# Patient Record
Sex: FEMALE | Race: WHITE | NOT HISPANIC OR LATINO | Employment: OTHER | URBAN - METROPOLITAN AREA
[De-identification: names, ages, dates, MRNs, and addresses within clinical notes are randomized per-mention and may not be internally consistent; named-entity substitution may affect disease eponyms.]

---

## 2017-06-14 ENCOUNTER — OFFICE VISIT (OUTPATIENT)
Dept: OTHER | Facility: HOSPITAL | Age: 72
End: 2017-06-14
Attending: THORACIC SURGERY (CARDIOTHORACIC VASCULAR SURGERY)

## 2017-06-14 VITALS
SYSTOLIC BLOOD PRESSURE: 138 MMHG | HEIGHT: 60 IN | WEIGHT: 139.5 LBS | DIASTOLIC BLOOD PRESSURE: 80 MMHG | TEMPERATURE: 97.6 F | OXYGEN SATURATION: 96 % | RESPIRATION RATE: 16 BRPM | HEART RATE: 90 BPM | BODY MASS INDEX: 27.39 KG/M2

## 2017-06-14 DIAGNOSIS — R91.8 GROUND GLASS OPACITY PRESENT ON IMAGING OF LUNG: ICD-10-CM

## 2017-06-14 DIAGNOSIS — Z85.118 HISTORY OF LUNG CANCER: Primary | ICD-10-CM

## 2017-06-14 PROCEDURE — 99204 OFFICE O/P NEW MOD 45 MIN: CPT | Performed by: THORACIC SURGERY (CARDIOTHORACIC VASCULAR SURGERY)

## 2017-06-14 PROCEDURE — G0463 HOSPITAL OUTPT CLINIC VISIT: HCPCS

## 2017-06-14 RX ORDER — GLIMEPIRIDE 4 MG/1
4 TABLET ORAL
COMMUNITY
Start: 2017-03-21

## 2017-06-14 RX ORDER — LIRAGLUTIDE 6 MG/ML
1.8 INJECTION SUBCUTANEOUS EVERY MORNING
COMMUNITY
Start: 2017-06-12

## 2017-06-14 RX ORDER — CITALOPRAM 20 MG/1
20 TABLET ORAL EVERY MORNING
COMMUNITY
Start: 2017-04-27

## 2017-06-14 RX ORDER — ASPIRIN 81 MG/1
81 TABLET, CHEWABLE ORAL DAILY
COMMUNITY
End: 2017-10-04

## 2017-06-14 RX ORDER — ATORVASTATIN CALCIUM 80 MG/1
80 TABLET, FILM COATED ORAL NIGHTLY
COMMUNITY
Start: 2017-04-27

## 2017-06-14 RX ORDER — LISINOPRIL AND HYDROCHLOROTHIAZIDE 12.5; 1 MG/1; MG/1
1 TABLET ORAL EVERY MORNING
COMMUNITY
Start: 2017-04-27

## 2017-06-14 NOTE — PROGRESS NOTES
Subjective   Patient ID: Augustina Lobo is a 72 y.o. female is being seen for consultation today at the request of Doris Talbert MD    History of Present Illness  Dear Colleague,  Augustina Lobo was seen in the [facility] for evaluation of a by a left lower lobe groundglass opacity.  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  She has done quite well with very little side effects associated with her surgery and has not had any evidence of recurrent or residual disease for several years.  She currently remains asymptomatic with no complaints other than some intermittent arthritis.  She has had a small but steadily increasing in size area of groundglass opacity within the left lower lobe which is been surveilled since 2015.  She has an occasional dry nonproductive cough which is intermittent and she believes is associated to postnasal drip.  She also has shortness of breath with physical activity but this usually resolves spontaneously and is been present since her surgery in 2009.  She reports no recent exacerbation of the symptoms.  She denies any complaints of fever, chills, hemoptysis, pleuritic chest pain, shortness of air, night sweats, hoarseness, or unintentional weight loss. Underlying medical conditions including hypercholesterolemia, diabetes and hypertension remain stable.  She has no other somatic complaints or alleviating or exacerbating factors aside from those mentioned above.    The following portions of the patient's history were reviewed and updated as appropriate: allergies, current medications, past family history, past medical history, past social history, past surgical history and problem list.  Review of Systems   Constitution: Negative for decreased appetite, diaphoresis, fever and weight loss.   Respiratory: Positive for cough. Negative for hemoptysis,  shortness of breath, sleep disturbances due to breathing and sputum production.    All other systems reviewed and are negative.     The patient reports some shortness of breath with occasional cough.  She has some sinus symptoms with allergies.  She has joint pain and stiffness with back pain.    There is no problem list on file for this patient.    Past Medical History:   Diagnosis Date   • Arthritis    • Diabetes mellitus    • Elevated cholesterol    • Hypertension    • Lung cancer 06/09/2009    Resected Right Lobectomy per Dr. Morataya   • Pneumonia      Past Surgical History:   Procedure Laterality Date   • APPENDECTOMY     • BUNIONECTOMY     • DILATATION AND CURETTAGE      Times 2   • HYSTERECTOMY     • TONSILLECTOMY       No family history on file.  Social History     Social History   • Marital status:      Spouse name: N/A   • Number of children: N/A   • Years of education: N/A     Occupational History   • Not on file.     Social History Main Topics   • Smoking status: Former Smoker     Packs/day: 0.75     Years: 18.00     Types: Cigarettes     Quit date: 6/14/1969   • Smokeless tobacco: Never Used   • Alcohol use No   • Drug use: No   • Sexual activity: Defer     Other Topics Concern   • Not on file     Social History Narrative   • No narrative on file       Current Outpatient Prescriptions:   •  aspirin 81 MG chewable tablet, Chew 81 mg Daily., Disp: , Rfl:   •  VICTOZA 18 MG/3ML solution pen-injector, Take 1 tablet by mouth Daily., Disp: , Rfl:   •  atorvastatin (LIPITOR) 80 MG tablet, Take 80 mg by mouth Daily., Disp: , Rfl:   •  citalopram (CeleXA) 20 MG tablet, Take 20 mg by mouth Daily., Disp: , Rfl:   •  glimepiride (AMARYL) 4 MG tablet, Take 4 mg by mouth Daily., Disp: , Rfl:   •  lisinopril-hydrochlorothiazide (PRINZIDE,ZESTORETIC) 10-12.5 MG per tablet, Take 1 tablet by mouth Daily., Disp: , Rfl:   •  metFORMIN (GLUCOPHAGE) 1000 MG tablet, Take 1 tablet by mouth 2 (Two) Times a Day., Disp: ,  Rfl:   Allergies   Allergen Reactions   • Codeine    • Latex Rash   • Sulfa Antibiotics Rash        Objective   Vitals:    06/14/17 1024   BP: 138/80   Pulse: 90   Resp: 16   Temp: 97.6 °F (36.4 °C)   SpO2: 96%     Physical Exam   Constitutional: She is oriented to person, place, and time. Vital signs are normal. She appears well-developed.   HENT:   Head: Normocephalic and atraumatic.   Eyes: EOM are normal. Pupils are equal, round, and reactive to light.   Neck: Normal range of motion. Neck supple. No JVD present. No tracheal deviation present.   Cardiovascular: Normal rate, regular rhythm, normal heart sounds and intact distal pulses.    No murmur heard.  Pulmonary/Chest: Effort normal and breath sounds normal. She has no wheezes. She has no rhonchi. She has no rales. She exhibits no tenderness.   Abdominal: Soft. There is no tenderness.   Musculoskeletal: Normal range of motion. She exhibits no edema or tenderness.   Lymphadenopathy:     She has no cervical adenopathy.   Neurological: She is alert and oriented to person, place, and time. She has normal strength.   Skin: Skin is warm and dry. No rash noted. No cyanosis or erythema.   Psychiatric: She has a normal mood and affect. Her behavior is normal. Thought content normal.     Independent Review of Radiographic Studies:      Assessment/Plan   Assessment:  Prior history of right middle lobe lung cancer, stage IB poorly differentiated adenocarcinoma status post resection in 2009.  New area of groundglass opacity within the left lower lobe superior segment which has increased in size, steadily, over the past 3 years.    Plan:  Mrs. Lobo has a subtle groundglass opacity within left lower lobe that is slowly increasing in size.  I have recommended that we keep a closer surveillance interval and therefore had a we'll see her back in the office in 3 months with a repeat CAT scan of the chest.  If we start to see consolidation or further opacification of this  lesion a CT-guided needle biopsy may be appropriate next step.  I do not believe that this is amenable to bronchoscopy but I do not rule out out as a further means working up this lesion.  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  I will update you after her CAT scan in 3 months and have further recommendations at that time.  Should you have any questions or concerns regarding your patient's care, please do not hesitate to contact me.  Sincerely, Wayne Morataya M.D.  There are no diagnoses linked to this encounter.

## 2017-06-15 NOTE — PATIENT INSTRUCTIONS
Pt seen by Dr. Morataya and will be scheduled for a CT chest at [facility] and will follow up in the thoracic office. Pt given contact cards for Dr. Morataya and nurse navigator.

## 2017-09-25 ENCOUNTER — OFFICE VISIT (OUTPATIENT)
Dept: SURGERY | Facility: CLINIC | Age: 72
End: 2017-09-25

## 2017-09-25 VITALS
OXYGEN SATURATION: 96 % | HEART RATE: 85 BPM | HEIGHT: 60 IN | WEIGHT: 143.6 LBS | DIASTOLIC BLOOD PRESSURE: 71 MMHG | BODY MASS INDEX: 28.19 KG/M2 | SYSTOLIC BLOOD PRESSURE: 127 MMHG

## 2017-09-25 DIAGNOSIS — R91.8 GROUND GLASS OPACITY PRESENT ON IMAGING OF LUNG: ICD-10-CM

## 2017-09-25 DIAGNOSIS — Z85.118 HISTORY OF LUNG CANCER: Primary | ICD-10-CM

## 2017-09-25 PROCEDURE — 99213 OFFICE O/P EST LOW 20 MIN: CPT | Performed by: THORACIC SURGERY (CARDIOTHORACIC VASCULAR SURGERY)

## 2017-09-25 RX ORDER — PIOGLITAZONEHYDROCHLORIDE 15 MG/1
15 TABLET ORAL EVERY MORNING
COMMUNITY
Start: 2017-09-15

## 2017-09-25 NOTE — PROGRESS NOTES
Subjective   Patient ID: Augustina Lobo is a 72 y.o. female is here today for follow-up.    History of Present Illness  Dear Colleague,  Augustina Lobo was seen in our office today for continued follow up and surveillance of her left lower lobe pulmonary nodule.  As you recall she has a history of stage IB adenocarcinoma of the right middle lobe resected in 2009.  She has had surveillance CT scans which have continually demonstrated subtle but steady growth of a left lower lobe groundglass opacity.  This has been suspicious for potential low-grade malignancy such as a minimally invasive adenocarcinoma, formally KASIA or possibly even an infectious or inflammatory condition.  She denies any complaints of fever, chills, cough, hemoptysis, pleuritic chest pain, shortness of air, dyspnea with exertion, night sweats, hoarseness, or unintentional weight loss. Underlying medical conditions including diabetes mellitus, arthritis and hypertension remain stable.  She has no other somatic complaints or alleviating or exacerbating factors aside from those mentioned above.    The following portions of the patient's history were reviewed and updated as appropriate: allergies, current medications, past family history, past medical history, past social history, past surgical history and problem list.  Review of Systems   Constitution: Negative.   HENT: Negative.    Eyes: Negative.    Cardiovascular: Negative.    Respiratory: Positive for cough, shortness of breath and wheezing.         Non productive   Endocrine: Negative.    Hematologic/Lymphatic: Negative.    Skin: Negative.    Musculoskeletal: Negative.    Gastrointestinal: Negative.    Genitourinary: Negative.    Neurological: Negative.    Psychiatric/Behavioral: Negative.      Patient Active Problem List   Diagnosis   • History of lung cancer   • Ground glass opacity present on imaging of lung     Past Medical History:   Diagnosis Date   • Arthritis    • Diabetes mellitus    •  Elevated cholesterol    • Hypertension    • Lung cancer 06/09/2009    Resected Right Lobectomy per Dr. Morataya   • Pneumonia      Past Surgical History:   Procedure Laterality Date   • APPENDECTOMY     • BUNIONECTOMY     • DILATATION AND CURETTAGE      Times 2   • HYSTERECTOMY     • TONSILLECTOMY       No family history on file.  Social History     Social History   • Marital status:      Spouse name: N/A   • Number of children: N/A   • Years of education: N/A     Occupational History   • Not on file.     Social History Main Topics   • Smoking status: Former Smoker     Packs/day: 0.75     Years: 18.00     Types: Cigarettes     Quit date: 6/14/1969   • Smokeless tobacco: Never Used   • Alcohol use No   • Drug use: No   • Sexual activity: Defer     Other Topics Concern   • Not on file     Social History Narrative       Current Outpatient Prescriptions:   •  aspirin 81 MG chewable tablet, Chew 81 mg Daily., Disp: , Rfl:   •  atorvastatin (LIPITOR) 80 MG tablet, Take 80 mg by mouth Daily., Disp: , Rfl:   •  citalopram (CeleXA) 20 MG tablet, Take 20 mg by mouth Daily., Disp: , Rfl:   •  glimepiride (AMARYL) 4 MG tablet, Take 4 mg by mouth Daily., Disp: , Rfl:   •  lisinopril-hydrochlorothiazide (PRINZIDE,ZESTORETIC) 10-12.5 MG per tablet, Take 1 tablet by mouth Daily., Disp: , Rfl:   •  metFORMIN (GLUCOPHAGE) 1000 MG tablet, Take 1 tablet by mouth 2 (Two) Times a Day., Disp: , Rfl:   •  pioglitazone (ACTOS) 15 MG tablet, , Disp: , Rfl:   •  VICTOZA 18 MG/3ML solution pen-injector, Take 1 tablet by mouth Daily., Disp: , Rfl:   Allergies   Allergen Reactions   • Codeine    • Latex Rash   • Sulfa Antibiotics Rash        Objective   Vitals:    09/25/17 1343   BP: 127/71   Pulse: 85   SpO2: 96%     Physical Exam   Constitutional: She is oriented to person, place, and time. Vital signs are normal. She appears well-developed and well-nourished.   HENT:   Head: Normocephalic and atraumatic.   Eyes: EOM are normal. Pupils are  equal, round, and reactive to light.   Neck: Normal range of motion. Neck supple. No JVD present. No tracheal deviation present.   Cardiovascular: Normal rate, regular rhythm, normal heart sounds and intact distal pulses.    No murmur heard.  Pulmonary/Chest: Effort normal and breath sounds normal. She has no wheezes. She has no rhonchi. She has no rales. She exhibits no tenderness.   Abdominal: Soft. There is no tenderness.   Musculoskeletal: Normal range of motion. She exhibits no edema or tenderness.   Lymphadenopathy:     She has no cervical adenopathy.   Neurological: She is alert and oriented to person, place, and time. She has normal strength.   Skin: Skin is warm and dry. No rash noted. No cyanosis or erythema.   Psychiatric: She has a normal mood and affect. Her behavior is normal.     Independent Review of Radiographic Studies:      Assessment/Plan   Assessment:  Enlarging left lower lobe superior segment groundglass opacity.  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    Plan:  I am concerned about the progress of growth of the left lower lobe pulmonary opacity.  I agree that this likely represents a malignancy although infectious or inflammatory etiologies cannot be excluded.  I recommended moving forward with surgical resection of this lesion with a wedge resection or possible segmentectomy possible lobectomy if this is a malignancy.  I believe this is approachable with a video-assisted thoracoscopy as mentioned above wedge or segmentectomy may be a viable option.  She is scheduled for point function test and we will review those prior to making any decisions about surgical resection.  The risk, benefits and alternatives of the surgery were discussed with the patient and her daughter and these include but are not limited to bleeding, infection, and injury to lung.  I will update you after her poorly function tests have been completed and about our plans  moving forward. Should you have any questions or concerns regarding your patient's care, please do not hesitate to contact me.  Sincerely, Wayne Morataya M.D.  There are no diagnoses linked to this encounter.

## 2017-09-26 ENCOUNTER — PREP FOR SURGERY (OUTPATIENT)
Dept: OTHER | Facility: HOSPITAL | Age: 72
End: 2017-09-26

## 2017-09-26 DIAGNOSIS — R79.1 ABNORMAL COAGULATION PROFILE: ICD-10-CM

## 2017-09-26 DIAGNOSIS — R91.1 LUNG NODULE: Primary | ICD-10-CM

## 2017-09-26 RX ORDER — CEFAZOLIN SODIUM 2 G/100ML
2 INJECTION, SOLUTION INTRAVENOUS ONCE
Status: CANCELLED | OUTPATIENT
Start: 2017-10-10 | End: 2017-09-26

## 2017-09-26 RX ORDER — HEPARIN SODIUM 5000 [USP'U]/ML
5000 INJECTION, SOLUTION INTRAVENOUS; SUBCUTANEOUS ONCE
Status: CANCELLED | OUTPATIENT
Start: 2017-10-10 | End: 2017-09-26

## 2017-09-26 RX ORDER — SODIUM CHLORIDE 0.9 % (FLUSH) 0.9 %
1-10 SYRINGE (ML) INJECTION AS NEEDED
Status: CANCELLED | OUTPATIENT
Start: 2017-10-10

## 2017-09-26 NOTE — H&P
History and Physical   Encounter Date: 9/25/2017  Wayne Morataya MD   Thoracic Surgery   Expand All Collapse All    []Hide copied text     Subjective      Patient ID: Augustina Lobo is a 72 y.o. female is here today for follow-up.     History of Present Illness  Dear Colleague,  Augustina Lobo was seen in our office today for continued follow up and surveillance of her left lower lobe pulmonary nodule.  As you recall she has a history of stage IB adenocarcinoma of the right middle lobe resected in 2009.  She has had surveillance CT scans which have continually demonstrated subtle but steady growth of a left lower lobe groundglass opacity.  This has been suspicious for potential low-grade malignancy such as a minimally invasive adenocarcinoma, formally KASIA or possibly even an infectious or inflammatory condition.  She denies any complaints of fever, chills, cough, hemoptysis, pleuritic chest pain, shortness of air, dyspnea with exertion, night sweats, hoarseness, or unintentional weight loss. Underlying medical conditions including diabetes mellitus, arthritis and hypertension remain stable.  She has no other somatic complaints or alleviating or exacerbating factors aside from those mentioned above.     The following portions of the patient's history were reviewed and updated as appropriate: allergies, current medications, past family history, past medical history, past social history, past surgical history and problem list.  Review of Systems   Constitution: Negative.   HENT: Negative.    Eyes: Negative.    Cardiovascular: Negative.    Respiratory: Positive for cough, shortness of breath and wheezing.         Non productive   Endocrine: Negative.    Hematologic/Lymphatic: Negative.    Skin: Negative.    Musculoskeletal: Negative.    Gastrointestinal: Negative.    Genitourinary: Negative.    Neurological: Negative.    Psychiatric/Behavioral: Negative.           Patient Active Problem List   Diagnosis   • History of  lung cancer   • Ground glass opacity present on imaging of lung       Medical History         Past Medical History:   Diagnosis Date   • Arthritis     • Diabetes mellitus     • Elevated cholesterol     • Hypertension     • Lung cancer 06/09/2009     Resected Right Lobectomy per Dr. Morataya   • Pneumonia            Surgical History          Past Surgical History:   Procedure Laterality Date   • APPENDECTOMY       • BUNIONECTOMY       • DILATATION AND CURETTAGE         Times 2   • HYSTERECTOMY       • TONSILLECTOMY             No family history on file.   Social History    Social History            Social History   • Marital status:        Spouse name: N/A   • Number of children: N/A   • Years of education: N/A          Occupational History   • Not on file.            Social History Main Topics   • Smoking status: Former Smoker       Packs/day: 0.75       Years: 18.00       Types: Cigarettes       Quit date: 6/14/1969   • Smokeless tobacco: Never Used   • Alcohol use No   • Drug use: No   • Sexual activity: Defer           Other Topics Concern   • Not on file      Social History Narrative            Current Outpatient Prescriptions:   •  aspirin 81 MG chewable tablet, Chew 81 mg Daily., Disp: , Rfl:   •  atorvastatin (LIPITOR) 80 MG tablet, Take 80 mg by mouth Daily., Disp: , Rfl:   •  citalopram (CeleXA) 20 MG tablet, Take 20 mg by mouth Daily., Disp: , Rfl:   •  glimepiride (AMARYL) 4 MG tablet, Take 4 mg by mouth Daily., Disp: , Rfl:   •  lisinopril-hydrochlorothiazide (PRINZIDE,ZESTORETIC) 10-12.5 MG per tablet, Take 1 tablet by mouth Daily., Disp: , Rfl:   •  metFORMIN (GLUCOPHAGE) 1000 MG tablet, Take 1 tablet by mouth 2 (Two) Times a Day., Disp: , Rfl:   •  pioglitazone (ACTOS) 15 MG tablet, , Disp: , Rfl:   •  VICTOZA 18 MG/3ML solution pen-injector, Take 1 tablet by mouth Daily., Disp: , Rfl:        Allergies   Allergen Reactions   • Codeine     • Latex Rash   • Sulfa Antibiotics Rash             Objective           Vitals:     09/25/17 1343   BP: 127/71   Pulse: 85   SpO2: 96%      Physical Exam   Constitutional: She is oriented to person, place, and time. Vital signs are normal. She appears well-developed and well-nourished.   HENT:   Head: Normocephalic and atraumatic.   Eyes: EOM are normal. Pupils are equal, round, and reactive to light.   Neck: Normal range of motion. Neck supple. No JVD present. No tracheal deviation present.   Cardiovascular: Normal rate, regular rhythm, normal heart sounds and intact distal pulses.    No murmur heard.  Pulmonary/Chest: Effort normal and breath sounds normal. She has no wheezes. She has no rhonchi. She has no rales. She exhibits no tenderness.   Abdominal: Soft. There is no tenderness.   Musculoskeletal: Normal range of motion. She exhibits no edema or tenderness.   Lymphadenopathy:     She has no cervical adenopathy.   Neurological: She is alert and oriented to person, place, and time. She has normal strength.   Skin: Skin is warm and dry. No rash noted. No cyanosis or erythema.   Psychiatric: She has a normal mood and affect. Her behavior is normal.      Independent Review of Radiographic Studies:         Assessment/Plan      Assessment:  Enlarging left lower lobe superior segment groundglass opacity.  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     Plan:  I am concerned about the progress of growth of the left lower lobe pulmonary opacity.  I agree that this likely represents a malignancy although infectious or inflammatory etiologies cannot be excluded.  I recommended moving forward with surgical resection of this lesion with a wedge resection or possible segmentectomy possible lobectomy if this is a malignancy.  I believe this is approachable with a video-assisted thoracoscopy as mentioned above wedge or segmentectomy may be a viable option.  She is scheduled for point function test and we will review those prior to  making any decisions about surgical resection.  The risk, benefits and alternatives of the surgery were discussed with the patient and her daughter and these include but are not limited to bleeding, infection, and injury to lung.  I will update you after her poorly function tests have been completed and about our plans moving forward. Should you have any questions or concerns regarding your patient's care, please do not hesitate to contact me.  Sincerely, Wayne Morataya M.D.  There are no diagnoses linked to this encounter.

## 2017-10-04 ENCOUNTER — APPOINTMENT (OUTPATIENT)
Dept: PREADMISSION TESTING | Facility: HOSPITAL | Age: 72
End: 2017-10-04

## 2017-10-04 VITALS
DIASTOLIC BLOOD PRESSURE: 70 MMHG | WEIGHT: 145 LBS | RESPIRATION RATE: 16 BRPM | HEART RATE: 83 BPM | SYSTOLIC BLOOD PRESSURE: 127 MMHG | BODY MASS INDEX: 26.68 KG/M2 | TEMPERATURE: 97.4 F | HEIGHT: 62 IN | OXYGEN SATURATION: 97 %

## 2017-10-04 DIAGNOSIS — R79.1 ABNORMAL COAGULATION PROFILE: ICD-10-CM

## 2017-10-04 DIAGNOSIS — R91.1 LUNG NODULE: ICD-10-CM

## 2017-10-04 LAB
ABO GROUP BLD: NORMAL
ANION GAP SERPL CALCULATED.3IONS-SCNC: 13.1 MMOL/L
APTT PPP: 31.3 SECONDS (ref 22.7–35.4)
BASOPHILS # BLD AUTO: 0.07 10*3/MM3 (ref 0–0.2)
BASOPHILS NFR BLD AUTO: 1 % (ref 0–1.5)
BLD GP AB SCN SERPL QL: NEGATIVE
BUN BLD-MCNC: 9 MG/DL (ref 8–23)
BUN/CREAT SERPL: 13 (ref 7–25)
CALCIUM SPEC-SCNC: 10.4 MG/DL (ref 8.6–10.5)
CHLORIDE SERPL-SCNC: 100 MMOL/L (ref 98–107)
CO2 SERPL-SCNC: 25.9 MMOL/L (ref 22–29)
CREAT BLD-MCNC: 0.69 MG/DL (ref 0.57–1)
DEPRECATED RDW RBC AUTO: 47.9 FL (ref 37–54)
EOSINOPHIL # BLD AUTO: 0.21 10*3/MM3 (ref 0–0.7)
EOSINOPHIL NFR BLD AUTO: 2.9 % (ref 0.3–6.2)
ERYTHROCYTE [DISTWIDTH] IN BLOOD BY AUTOMATED COUNT: 13.6 % (ref 11.7–13)
GFR SERPL CREATININE-BSD FRML MDRD: 83 ML/MIN/1.73
GLUCOSE BLD-MCNC: 71 MG/DL (ref 65–99)
HCT VFR BLD AUTO: 42.3 % (ref 35.6–45.5)
HGB BLD-MCNC: 13.4 G/DL (ref 11.9–15.5)
IMM GRANULOCYTES # BLD: 0.02 10*3/MM3 (ref 0–0.03)
IMM GRANULOCYTES NFR BLD: 0.3 % (ref 0–0.5)
INR PPP: 0.93 (ref 0.9–1.1)
LYMPHOCYTES # BLD AUTO: 2.77 10*3/MM3 (ref 0.9–4.8)
LYMPHOCYTES NFR BLD AUTO: 37.8 % (ref 19.6–45.3)
MCH RBC QN AUTO: 30.6 PG (ref 26.9–32)
MCHC RBC AUTO-ENTMCNC: 31.7 G/DL (ref 32.4–36.3)
MCV RBC AUTO: 96.6 FL (ref 80.5–98.2)
MONOCYTES # BLD AUTO: 0.4 10*3/MM3 (ref 0.2–1.2)
MONOCYTES NFR BLD AUTO: 5.5 % (ref 5–12)
NEUTROPHILS # BLD AUTO: 3.85 10*3/MM3 (ref 1.9–8.1)
NEUTROPHILS NFR BLD AUTO: 52.5 % (ref 42.7–76)
PLATELET # BLD AUTO: 288 10*3/MM3 (ref 140–500)
PMV BLD AUTO: 10 FL (ref 6–12)
POTASSIUM BLD-SCNC: 4.3 MMOL/L (ref 3.5–5.2)
PROTHROMBIN TIME: 12.1 SECONDS (ref 11.7–14.2)
RBC # BLD AUTO: 4.38 10*6/MM3 (ref 3.9–5.2)
RH BLD: POSITIVE
SODIUM BLD-SCNC: 139 MMOL/L (ref 136–145)
WBC NRBC COR # BLD: 7.32 10*3/MM3 (ref 4.5–10.7)

## 2017-10-04 PROCEDURE — 86901 BLOOD TYPING SEROLOGIC RH(D): CPT | Performed by: THORACIC SURGERY (CARDIOTHORACIC VASCULAR SURGERY)

## 2017-10-04 PROCEDURE — 85610 PROTHROMBIN TIME: CPT | Performed by: THORACIC SURGERY (CARDIOTHORACIC VASCULAR SURGERY)

## 2017-10-04 PROCEDURE — 36415 COLL VENOUS BLD VENIPUNCTURE: CPT

## 2017-10-04 PROCEDURE — 85730 THROMBOPLASTIN TIME PARTIAL: CPT | Performed by: THORACIC SURGERY (CARDIOTHORACIC VASCULAR SURGERY)

## 2017-10-04 PROCEDURE — 86850 RBC ANTIBODY SCREEN: CPT | Performed by: THORACIC SURGERY (CARDIOTHORACIC VASCULAR SURGERY)

## 2017-10-04 PROCEDURE — 80048 BASIC METABOLIC PNL TOTAL CA: CPT | Performed by: THORACIC SURGERY (CARDIOTHORACIC VASCULAR SURGERY)

## 2017-10-04 PROCEDURE — 93005 ELECTROCARDIOGRAM TRACING: CPT

## 2017-10-04 PROCEDURE — 86900 BLOOD TYPING SEROLOGIC ABO: CPT | Performed by: THORACIC SURGERY (CARDIOTHORACIC VASCULAR SURGERY)

## 2017-10-04 PROCEDURE — 85025 COMPLETE CBC W/AUTO DIFF WBC: CPT | Performed by: THORACIC SURGERY (CARDIOTHORACIC VASCULAR SURGERY)

## 2017-10-04 PROCEDURE — 93010 ELECTROCARDIOGRAM REPORT: CPT | Performed by: INTERNAL MEDICINE

## 2017-10-04 RX ORDER — UBIDECARENONE 75 MG
50 CAPSULE ORAL DAILY
COMMUNITY

## 2017-10-04 RX ORDER — ASPIRIN 81 MG/1
81 TABLET ORAL DAILY
COMMUNITY

## 2017-10-04 RX ORDER — ASCORBIC ACID 500 MG
500 TABLET ORAL DAILY
COMMUNITY

## 2017-10-04 NOTE — DISCHARGE INSTRUCTIONS
Take the following medications the morning of surgery with a small sip of water:    CITALOPRAM     ARRIVE AT 5:30    General Instructions:  • Do not eat solid food after midnight the night before surgery.  • You may drink clear liquids day of surgery but must stop at least one hour before your hospital arrival time.  • It is beneficial for you to have a clear drink that contains carbohydrates the day of surgery.  We suggest a 20 ounce bottle of Gatorade or Powerade for non-diabetic patients or a 20 ounce bottle of G2 or Powerade Zero for diabetic patients. (Pediatric patients, are not advised to drink a 20 ounce carbohydrate drink)    Clear liquids are liquids you can see through.  Nothing red in color.     Plain water                               Sports drinks  Sodas                                   Gelatin (Jell-O)  Fruit juices without pulp such as white grape juice and apple juice  Popsicles that contain no fruit or yogurt  Tea or coffee (no cream or milk added)  Gatorade / Powerade  G2 / Powerade Zero    • Infants may have breast milk up to four hours before surgery.  • Infants drinking formula may drink formula up to six hours before surgery.   • Patients who avoid smoking, chewing tobacco and alcohol for 4 weeks prior to surgery have a reduced risk of post-operative complications.  Quit smoking as many days before surgery as you can.  • Do not smoke, use chewing tobacco or drink alcohol the day of surgery.   • If applicable bring your C-PAP/ BI-PAP machine.  • Bring any papers given to you in the doctor’s office.  • Wear clean comfortable clothes and socks.  • Do not wear contact lenses or make-up.  Bring a case for your glasses.   • Bring crutches or walker if applicable.  • Leave all other valuables and jewelry at home.  • The Pre-Admission Testing nurse will instruct you to bring medications if unable to obtain an accurate list in Pre-Admission Testing.        If you were given a blood bank ID arm band  remember to bring it with you the day of surgery.    Preventing a Surgical Site Infection:  • For 2 to 3 days before surgery, avoid shaving with a razor because the razor can irritate skin and make it easier to develop an infection.  • The night prior to surgery sleep in a clean bed with clean clothing.  Do not allow pets to sleep with you.  • Shower on the morning of surgery using a fresh bar of anti-bacterial soap (such as Dial) and clean washcloth.  Dry with a clean towel and dress in clean clothing.  • Ask your surgeon if you will be receiving antibiotics prior to surgery.  • Make sure you, your family, and all healthcare providers clean their hands with soap and water or an alcohol based hand  before caring for you or your wound.    Day of surgery:  Upon arrival, a Pre-op nurse and Anesthesiologist will review your health history, obtain vital signs, and answer questions you may have.  The only belongings needed at this time will be your home medications and if applicable your C-PAP/BI-PAP machine.  If you are staying overnight your family can leave the rest of your belongings in the car and bring them to your room later.  A Pre-op nurse will start an IV and you may receive medication in preparation for surgery, including something to help you relax.  Your family will be able to see you in the Pre-op area.  While you are in surgery your family should notify the waiting room  if they leave the waiting room area and provide a contact phone number.    Please be aware that surgery does come with discomfort.  We want to make every effort to control your discomfort so please discuss any uncontrolled symptoms with your nurse.   Your doctor will most likely have prescribed pain medications.      If you are going home after surgery you will receive individualized written care instructions before being discharged.  A responsible adult must drive you to and from the hospital on the day of your surgery  and stay with you for 24 hours.    If you are staying overnight following surgery, you will be transported to your hospital room following the recovery period.  [facility] has all private rooms.    If you have any questions please call Pre-Admission Testing at [phone].  Deductibles and co-payments are collected on the day of service. Please be prepared to pay the required co-pay, deductible or deposit on the day of service as defined by your plan.

## 2017-10-10 ENCOUNTER — ANESTHESIA EVENT (OUTPATIENT)
Dept: PERIOP | Facility: HOSPITAL | Age: 72
End: 2017-10-10

## 2017-10-10 ENCOUNTER — ANESTHESIA (OUTPATIENT)
Dept: PERIOP | Facility: HOSPITAL | Age: 72
End: 2017-10-10

## 2017-10-10 ENCOUNTER — HOSPITAL ENCOUNTER (INPATIENT)
Facility: HOSPITAL | Age: 72
LOS: 3 days | Discharge: HOME | End: 2017-10-13
Attending: THORACIC SURGERY (CARDIOTHORACIC VASCULAR SURGERY) | Admitting: THORACIC SURGERY (CARDIOTHORACIC VASCULAR SURGERY)

## 2017-10-10 ENCOUNTER — APPOINTMENT (OUTPATIENT)
Dept: GENERAL RADIOLOGY | Facility: HOSPITAL | Age: 72
End: 2017-10-10

## 2017-10-10 DIAGNOSIS — R53.1 GENERALIZED WEAKNESS: ICD-10-CM

## 2017-10-10 DIAGNOSIS — R91.1 LUNG NODULE: ICD-10-CM

## 2017-10-10 DIAGNOSIS — R91.1 LUNG NODULE, SOLITARY: Primary | ICD-10-CM

## 2017-10-10 LAB
GLUCOSE BLDC GLUCOMTR-MCNC: 115 MG/DL (ref 70–130)
GLUCOSE BLDC GLUCOMTR-MCNC: 172 MG/DL (ref 70–130)
GLUCOSE BLDC GLUCOMTR-MCNC: 192 MG/DL (ref 70–130)

## 2017-10-10 PROCEDURE — 94640 AIRWAY INHALATION TREATMENT: CPT

## 2017-10-10 PROCEDURE — 25010000002 DEXAMETHASONE PER 1 MG: Performed by: NURSE ANESTHETIST, CERTIFIED REGISTERED

## 2017-10-10 PROCEDURE — 25010000002 CALCIUM GLUCONATE 1 G/100 ML

## 2017-10-10 PROCEDURE — 88305 TISSUE EXAM BY PATHOLOGIST: CPT | Performed by: THORACIC SURGERY (CARDIOTHORACIC VASCULAR SURGERY)

## 2017-10-10 PROCEDURE — 25010000002 HYDROMORPHONE PER 4 MG: Performed by: NURSE ANESTHETIST, CERTIFIED REGISTERED

## 2017-10-10 PROCEDURE — 88309 TISSUE EXAM BY PATHOLOGIST: CPT | Performed by: THORACIC SURGERY (CARDIOTHORACIC VASCULAR SURGERY)

## 2017-10-10 PROCEDURE — 25010000002 KETOROLAC TROMETHAMINE PER 15 MG: Performed by: NURSE ANESTHETIST, CERTIFIED REGISTERED

## 2017-10-10 PROCEDURE — 94799 UNLISTED PULMONARY SVC/PX: CPT

## 2017-10-10 PROCEDURE — 71010 HC CHEST PA OR AP: CPT

## 2017-10-10 PROCEDURE — 25010000002 HEPARIN (PORCINE) PER 1000 UNITS: Performed by: THORACIC SURGERY (CARDIOTHORACIC VASCULAR SURGERY)

## 2017-10-10 PROCEDURE — 07T74ZZ RESECTION OF THORAX LYMPHATIC, PERCUTANEOUS ENDOSCOPIC APPROACH: ICD-10-PCS | Performed by: THORACIC SURGERY (CARDIOTHORACIC VASCULAR SURGERY)

## 2017-10-10 PROCEDURE — 88332 PATH CONSLTJ SURG EA ADD BLK: CPT | Performed by: THORACIC SURGERY (CARDIOTHORACIC VASCULAR SURGERY)

## 2017-10-10 PROCEDURE — 25010000003 CEFAZOLIN IN DEXTROSE 2-4 GM/100ML-% SOLUTION: Performed by: THORACIC SURGERY (CARDIOTHORACIC VASCULAR SURGERY)

## 2017-10-10 PROCEDURE — 0BJ08ZZ INSPECTION OF TRACHEOBRONCHIAL TREE, VIA NATURAL OR ARTIFICIAL OPENING ENDOSCOPIC: ICD-10-PCS | Performed by: THORACIC SURGERY (CARDIOTHORACIC VASCULAR SURGERY)

## 2017-10-10 PROCEDURE — 25010000002 ONDANSETRON PER 1 MG: Performed by: NURSE ANESTHETIST, CERTIFIED REGISTERED

## 2017-10-10 PROCEDURE — 25010000002 MORPHINE PER 10 MG: Performed by: THORACIC SURGERY (CARDIOTHORACIC VASCULAR SURGERY)

## 2017-10-10 PROCEDURE — 25010000002 FENTANYL CITRATE (PF) 100 MCG/2ML SOLUTION: Performed by: ANESTHESIOLOGY

## 2017-10-10 PROCEDURE — 82962 GLUCOSE BLOOD TEST: CPT

## 2017-10-10 PROCEDURE — C1729 CATH, DRAINAGE: HCPCS | Performed by: THORACIC SURGERY (CARDIOTHORACIC VASCULAR SURGERY)

## 2017-10-10 PROCEDURE — 25010000002 MIDAZOLAM PER 1 MG: Performed by: ANESTHESIOLOGY

## 2017-10-10 PROCEDURE — 88360 TUMOR IMMUNOHISTOCHEM/MANUAL: CPT

## 2017-10-10 PROCEDURE — 88331 PATH CONSLTJ SURG 1 BLK 1SPC: CPT | Performed by: THORACIC SURGERY (CARDIOTHORACIC VASCULAR SURGERY)

## 2017-10-10 PROCEDURE — 25010000002 NEOSTIGMINE PER 0.5 MG: Performed by: NURSE ANESTHETIST, CERTIFIED REGISTERED

## 2017-10-10 PROCEDURE — 25010000002 FENTANYL CITRATE (PF) 100 MCG/2ML SOLUTION: Performed by: NURSE ANESTHETIST, CERTIFIED REGISTERED

## 2017-10-10 PROCEDURE — 25010000002 PHENYLEPHRINE PER 1 ML: Performed by: NURSE ANESTHETIST, CERTIFIED REGISTERED

## 2017-10-10 PROCEDURE — 0BTJ4ZZ RESECTION OF LEFT LOWER LUNG LOBE, PERCUTANEOUS ENDOSCOPIC APPROACH: ICD-10-PCS | Performed by: THORACIC SURGERY (CARDIOTHORACIC VASCULAR SURGERY)

## 2017-10-10 PROCEDURE — 25010000002 MORPHINE SULFATE (PF) 2 MG/ML SOLUTION: Performed by: THORACIC SURGERY (CARDIOTHORACIC VASCULAR SURGERY)

## 2017-10-10 PROCEDURE — 32663 THORACOSCOPY W/LOBECTOMY: CPT | Performed by: THORACIC SURGERY (CARDIOTHORACIC VASCULAR SURGERY)

## 2017-10-10 PROCEDURE — 25010000002 PROPOFOL 10 MG/ML EMULSION: Performed by: NURSE ANESTHETIST, CERTIFIED REGISTERED

## 2017-10-10 PROCEDURE — 88307 TISSUE EXAM BY PATHOLOGIST: CPT | Performed by: THORACIC SURGERY (CARDIOTHORACIC VASCULAR SURGERY)

## 2017-10-10 DEVICE — STPL/LN REINF PERISTRIP DRY VERITAS GEL: Type: IMPLANTABLE DEVICE | Site: LUNG | Status: FUNCTIONAL

## 2017-10-10 DEVICE — PERI-STRIPS DRY WITH VERITAS COLLAGEN MATRIX (PSD-V) IS PREPARED FROM DEHYDRATED BOVINE PERICARDIUM PROCURED FROM CATTLE UNDER 30 MONTHS OF AGE IN THE UNITED STATES. ONE (1) TUBE OF PSD GEL (GEL) IS PROVIDED FOR EVERY TWO (2) POUCHES OF PSD-V. THE GEL IS USED TO CREATE A TEMPORARY BOND BETWEEN THE PSD-V BUTTRESS AND THE SURGICAL STAPLER JAWS UNTIL THE STAPLER IS POSITIONED AND FIRED.
Type: IMPLANTABLE DEVICE | Site: LUNG | Status: FUNCTIONAL
Brand: PERI-STRIPS DRY WITH VERITAS COLLAGEN MATRIX

## 2017-10-10 DEVICE — SEALANT WND FIBRIN TISSEEL VAPOR/HEAT/PREFIL/SYR 10ML: Type: IMPLANTABLE DEVICE | Site: CHEST | Status: FUNCTIONAL

## 2017-10-10 RX ORDER — FENTANYL CITRATE 50 UG/ML
50 INJECTION, SOLUTION INTRAMUSCULAR; INTRAVENOUS
Status: DISCONTINUED | OUTPATIENT
Start: 2017-10-10 | End: 2017-10-10 | Stop reason: HOSPADM

## 2017-10-10 RX ORDER — POLYETHYLENE GLYCOL 3350 17 G/17G
17 POWDER, FOR SOLUTION ORAL DAILY
Status: DISCONTINUED | OUTPATIENT
Start: 2017-10-10 | End: 2017-10-13 | Stop reason: HOSPADM

## 2017-10-10 RX ORDER — PIOGLITAZONEHYDROCHLORIDE 15 MG/1
15 TABLET ORAL EVERY MORNING
Status: DISCONTINUED | OUTPATIENT
Start: 2017-10-10 | End: 2017-10-13 | Stop reason: HOSPADM

## 2017-10-10 RX ORDER — DEXAMETHASONE SODIUM PHOSPHATE 10 MG/ML
INJECTION INTRAMUSCULAR; INTRAVENOUS AS NEEDED
Status: DISCONTINUED | OUTPATIENT
Start: 2017-10-10 | End: 2017-10-10 | Stop reason: SURG

## 2017-10-10 RX ORDER — MORPHINE SULFATE 2 MG/ML
4 INJECTION, SOLUTION INTRAMUSCULAR; INTRAVENOUS EVERY 4 HOURS PRN
Status: DISCONTINUED | OUTPATIENT
Start: 2017-10-10 | End: 2017-10-13 | Stop reason: HOSPADM

## 2017-10-10 RX ORDER — NALOXONE HCL 0.4 MG/ML
0.4 VIAL (ML) INJECTION
Status: DISCONTINUED | OUTPATIENT
Start: 2017-10-10 | End: 2017-10-13 | Stop reason: HOSPADM

## 2017-10-10 RX ORDER — PROMETHAZINE HYDROCHLORIDE 25 MG/ML
12.5 INJECTION, SOLUTION INTRAMUSCULAR; INTRAVENOUS ONCE AS NEEDED
Status: DISCONTINUED | OUTPATIENT
Start: 2017-10-10 | End: 2017-10-10

## 2017-10-10 RX ORDER — BUPIVACAINE HYDROCHLORIDE 2.5 MG/ML
INJECTION, SOLUTION INFILTRATION; PERINEURAL AS NEEDED
Status: DISCONTINUED | OUTPATIENT
Start: 2017-10-10 | End: 2017-10-10 | Stop reason: HOSPADM

## 2017-10-10 RX ORDER — ATORVASTATIN CALCIUM 80 MG/1
80 TABLET, FILM COATED ORAL NIGHTLY
Status: DISCONTINUED | OUTPATIENT
Start: 2017-10-10 | End: 2017-10-13 | Stop reason: HOSPADM

## 2017-10-10 RX ORDER — UBIDECARENONE 75 MG
50 CAPSULE ORAL DAILY
Status: DISCONTINUED | OUTPATIENT
Start: 2017-10-10 | End: 2017-10-10 | Stop reason: CLARIF

## 2017-10-10 RX ORDER — CITALOPRAM 20 MG/1
20 TABLET ORAL EVERY MORNING
Status: DISCONTINUED | OUTPATIENT
Start: 2017-10-10 | End: 2017-10-13 | Stop reason: HOSPADM

## 2017-10-10 RX ORDER — MORPHINE SULFATE 2 MG/ML
2 INJECTION, SOLUTION INTRAMUSCULAR; INTRAVENOUS EVERY 4 HOURS PRN
Status: DISCONTINUED | OUTPATIENT
Start: 2017-10-10 | End: 2017-10-13 | Stop reason: HOSPADM

## 2017-10-10 RX ORDER — ASCORBIC ACID 500 MG
500 TABLET ORAL DAILY
Status: DISCONTINUED | OUTPATIENT
Start: 2017-10-10 | End: 2017-10-13 | Stop reason: HOSPADM

## 2017-10-10 RX ORDER — MIDAZOLAM HYDROCHLORIDE 1 MG/ML
2 INJECTION INTRAMUSCULAR; INTRAVENOUS
Status: DISCONTINUED | OUTPATIENT
Start: 2017-10-10 | End: 2017-10-10 | Stop reason: HOSPADM

## 2017-10-10 RX ORDER — FAMOTIDINE 10 MG/ML
20 INJECTION, SOLUTION INTRAVENOUS ONCE
Status: COMPLETED | OUTPATIENT
Start: 2017-10-10 | End: 2017-10-10

## 2017-10-10 RX ORDER — NALOXONE HCL 0.4 MG/ML
0.2 VIAL (ML) INJECTION AS NEEDED
Status: DISCONTINUED | OUTPATIENT
Start: 2017-10-10 | End: 2017-10-10

## 2017-10-10 RX ORDER — CEFAZOLIN SODIUM 2 G/100ML
2 INJECTION, SOLUTION INTRAVENOUS ONCE
Status: COMPLETED | OUTPATIENT
Start: 2017-10-10 | End: 2017-10-10

## 2017-10-10 RX ORDER — SODIUM CHLORIDE 0.9 % (FLUSH) 0.9 %
1-10 SYRINGE (ML) INJECTION AS NEEDED
Status: DISCONTINUED | OUTPATIENT
Start: 2017-10-10 | End: 2017-10-10 | Stop reason: HOSPADM

## 2017-10-10 RX ORDER — PROPOFOL 10 MG/ML
VIAL (ML) INTRAVENOUS AS NEEDED
Status: DISCONTINUED | OUTPATIENT
Start: 2017-10-10 | End: 2017-10-10 | Stop reason: SURG

## 2017-10-10 RX ORDER — PROMETHAZINE HYDROCHLORIDE 25 MG/1
12.5 TABLET ORAL ONCE AS NEEDED
Status: DISCONTINUED | OUTPATIENT
Start: 2017-10-10 | End: 2017-10-10

## 2017-10-10 RX ORDER — LABETALOL HYDROCHLORIDE 5 MG/ML
INJECTION, SOLUTION INTRAVENOUS AS NEEDED
Status: DISCONTINUED | OUTPATIENT
Start: 2017-10-10 | End: 2017-10-10 | Stop reason: SURG

## 2017-10-10 RX ORDER — BISACODYL 10 MG
10 SUPPOSITORY, RECTAL RECTAL DAILY PRN
Status: DISCONTINUED | OUTPATIENT
Start: 2017-10-10 | End: 2017-10-13 | Stop reason: HOSPADM

## 2017-10-10 RX ORDER — ONDANSETRON 2 MG/ML
4 INJECTION INTRAMUSCULAR; INTRAVENOUS ONCE AS NEEDED
Status: DISCONTINUED | OUTPATIENT
Start: 2017-10-10 | End: 2017-10-10

## 2017-10-10 RX ORDER — EPHEDRINE SULFATE 50 MG/ML
5 INJECTION, SOLUTION INTRAVENOUS ONCE AS NEEDED
Status: DISCONTINUED | OUTPATIENT
Start: 2017-10-10 | End: 2017-10-10

## 2017-10-10 RX ORDER — LABETALOL HYDROCHLORIDE 5 MG/ML
5 INJECTION, SOLUTION INTRAVENOUS
Status: DISCONTINUED | OUTPATIENT
Start: 2017-10-10 | End: 2017-10-10

## 2017-10-10 RX ORDER — SODIUM CHLORIDE, SODIUM LACTATE, POTASSIUM CHLORIDE, CALCIUM CHLORIDE 600; 310; 30; 20 MG/100ML; MG/100ML; MG/100ML; MG/100ML
9 INJECTION, SOLUTION INTRAVENOUS CONTINUOUS
Status: DISCONTINUED | OUTPATIENT
Start: 2017-10-10 | End: 2017-10-13

## 2017-10-10 RX ORDER — ONDANSETRON 4 MG/1
4 TABLET, ORALLY DISINTEGRATING ORAL EVERY 6 HOURS PRN
Status: DISCONTINUED | OUTPATIENT
Start: 2017-10-10 | End: 2017-10-13 | Stop reason: HOSPADM

## 2017-10-10 RX ORDER — LISINOPRIL AND HYDROCHLOROTHIAZIDE 12.5; 1 MG/1; MG/1
1 TABLET ORAL EVERY MORNING
Status: DISCONTINUED | OUTPATIENT
Start: 2017-10-10 | End: 2017-10-13 | Stop reason: HOSPADM

## 2017-10-10 RX ORDER — ONDANSETRON 2 MG/ML
4 INJECTION INTRAMUSCULAR; INTRAVENOUS EVERY 6 HOURS PRN
Status: DISCONTINUED | OUTPATIENT
Start: 2017-10-10 | End: 2017-10-13 | Stop reason: HOSPADM

## 2017-10-10 RX ORDER — KETOROLAC TROMETHAMINE 30 MG/ML
INJECTION, SOLUTION INTRAMUSCULAR; INTRAVENOUS AS NEEDED
Status: DISCONTINUED | OUTPATIENT
Start: 2017-10-10 | End: 2017-10-10 | Stop reason: SURG

## 2017-10-10 RX ORDER — ACETAMINOPHEN 325 MG/1
650 TABLET ORAL EVERY 4 HOURS PRN
Status: DISCONTINUED | OUTPATIENT
Start: 2017-10-10 | End: 2017-10-13 | Stop reason: HOSPADM

## 2017-10-10 RX ORDER — ONDANSETRON 2 MG/ML
INJECTION INTRAMUSCULAR; INTRAVENOUS AS NEEDED
Status: DISCONTINUED | OUTPATIENT
Start: 2017-10-10 | End: 2017-10-10 | Stop reason: SURG

## 2017-10-10 RX ORDER — DIPHENHYDRAMINE HYDROCHLORIDE 50 MG/ML
12.5 INJECTION INTRAMUSCULAR; INTRAVENOUS
Status: DISCONTINUED | OUTPATIENT
Start: 2017-10-10 | End: 2017-10-10

## 2017-10-10 RX ORDER — MIDAZOLAM HYDROCHLORIDE 1 MG/ML
1 INJECTION INTRAMUSCULAR; INTRAVENOUS
Status: DISCONTINUED | OUTPATIENT
Start: 2017-10-10 | End: 2017-10-10 | Stop reason: HOSPADM

## 2017-10-10 RX ORDER — LIDOCAINE HYDROCHLORIDE 20 MG/ML
INJECTION, SOLUTION INFILTRATION; PERINEURAL AS NEEDED
Status: DISCONTINUED | OUTPATIENT
Start: 2017-10-10 | End: 2017-10-10 | Stop reason: SURG

## 2017-10-10 RX ORDER — MINERAL OIL 471.99 G/472ML
OIL TOPICAL AS NEEDED
Status: DISCONTINUED | OUTPATIENT
Start: 2017-10-10 | End: 2017-10-10 | Stop reason: HOSPADM

## 2017-10-10 RX ORDER — GLIPIZIDE 10 MG/1
10 TABLET ORAL
Status: DISCONTINUED | OUTPATIENT
Start: 2017-10-10 | End: 2017-10-13 | Stop reason: HOSPADM

## 2017-10-10 RX ORDER — MAGNESIUM HYDROXIDE 1200 MG/15ML
LIQUID ORAL AS NEEDED
Status: DISCONTINUED | OUTPATIENT
Start: 2017-10-10 | End: 2017-10-10 | Stop reason: HOSPADM

## 2017-10-10 RX ORDER — IPRATROPIUM BROMIDE AND ALBUTEROL SULFATE 2.5; .5 MG/3ML; MG/3ML
3 SOLUTION RESPIRATORY (INHALATION)
Status: COMPLETED | OUTPATIENT
Start: 2017-10-10 | End: 2017-10-12

## 2017-10-10 RX ORDER — ROCURONIUM BROMIDE 10 MG/ML
INJECTION, SOLUTION INTRAVENOUS AS NEEDED
Status: DISCONTINUED | OUTPATIENT
Start: 2017-10-10 | End: 2017-10-10 | Stop reason: SURG

## 2017-10-10 RX ORDER — HYDROCODONE BITARTRATE AND ACETAMINOPHEN 7.5; 325 MG/1; MG/1
2 TABLET ORAL EVERY 4 HOURS PRN
Status: DISCONTINUED | OUTPATIENT
Start: 2017-10-10 | End: 2017-10-11

## 2017-10-10 RX ORDER — NITROGLYCERIN 0.4 MG/1
0.4 TABLET SUBLINGUAL
Status: DISCONTINUED | OUTPATIENT
Start: 2017-10-10 | End: 2017-10-13 | Stop reason: HOSPADM

## 2017-10-10 RX ORDER — PROMETHAZINE HYDROCHLORIDE 25 MG/1
25 SUPPOSITORY RECTAL ONCE AS NEEDED
Status: DISCONTINUED | OUTPATIENT
Start: 2017-10-10 | End: 2017-10-10

## 2017-10-10 RX ORDER — HEPARIN SODIUM 5000 [USP'U]/ML
5000 INJECTION, SOLUTION INTRAVENOUS; SUBCUTANEOUS ONCE
Status: COMPLETED | OUTPATIENT
Start: 2017-10-10 | End: 2017-10-10

## 2017-10-10 RX ORDER — DOCUSATE SODIUM 100 MG/1
100 CAPSULE, LIQUID FILLED ORAL 2 TIMES DAILY
Status: DISCONTINUED | OUTPATIENT
Start: 2017-10-10 | End: 2017-10-13 | Stop reason: HOSPADM

## 2017-10-10 RX ORDER — SENNA AND DOCUSATE SODIUM 50; 8.6 MG/1; MG/1
2 TABLET, FILM COATED ORAL NIGHTLY
Status: DISCONTINUED | OUTPATIENT
Start: 2017-10-10 | End: 2017-10-13 | Stop reason: HOSPADM

## 2017-10-10 RX ORDER — EPHEDRINE SULFATE 50 MG/ML
INJECTION, SOLUTION INTRAVENOUS AS NEEDED
Status: DISCONTINUED | OUTPATIENT
Start: 2017-10-10 | End: 2017-10-10 | Stop reason: SURG

## 2017-10-10 RX ORDER — ONDANSETRON 4 MG/1
4 TABLET, FILM COATED ORAL EVERY 6 HOURS PRN
Status: DISCONTINUED | OUTPATIENT
Start: 2017-10-10 | End: 2017-10-13 | Stop reason: HOSPADM

## 2017-10-10 RX ORDER — SODIUM CHLORIDE 9 MG/ML
75 INJECTION, SOLUTION INTRAVENOUS CONTINUOUS
Status: DISCONTINUED | OUTPATIENT
Start: 2017-10-10 | End: 2017-10-11

## 2017-10-10 RX ORDER — HEPARIN SODIUM 5000 [USP'U]/ML
5000 INJECTION, SOLUTION INTRAVENOUS; SUBCUTANEOUS EVERY 8 HOURS SCHEDULED
Status: DISCONTINUED | OUTPATIENT
Start: 2017-10-11 | End: 2017-10-13 | Stop reason: HOSPADM

## 2017-10-10 RX ORDER — HYDROMORPHONE HYDROCHLORIDE 1 MG/ML
0.5 INJECTION, SOLUTION INTRAMUSCULAR; INTRAVENOUS; SUBCUTANEOUS
Status: DISCONTINUED | OUTPATIENT
Start: 2017-10-10 | End: 2017-10-10

## 2017-10-10 RX ORDER — FENTANYL CITRATE 50 UG/ML
INJECTION, SOLUTION INTRAMUSCULAR; INTRAVENOUS AS NEEDED
Status: DISCONTINUED | OUTPATIENT
Start: 2017-10-10 | End: 2017-10-10 | Stop reason: SURG

## 2017-10-10 RX ORDER — CEFAZOLIN SODIUM 2 G/100ML
2 INJECTION, SOLUTION INTRAVENOUS EVERY 8 HOURS
Status: COMPLETED | OUTPATIENT
Start: 2017-10-10 | End: 2017-10-11

## 2017-10-10 RX ORDER — HYDROMORPHONE HCL 110MG/55ML
PATIENT CONTROLLED ANALGESIA SYRINGE INTRAVENOUS AS NEEDED
Status: DISCONTINUED | OUTPATIENT
Start: 2017-10-10 | End: 2017-10-10 | Stop reason: SURG

## 2017-10-10 RX ORDER — FENTANYL CITRATE 50 UG/ML
50 INJECTION, SOLUTION INTRAMUSCULAR; INTRAVENOUS
Status: DISCONTINUED | OUTPATIENT
Start: 2017-10-10 | End: 2017-10-10

## 2017-10-10 RX ORDER — GLYCOPYRROLATE 0.2 MG/ML
INJECTION INTRAMUSCULAR; INTRAVENOUS AS NEEDED
Status: DISCONTINUED | OUTPATIENT
Start: 2017-10-10 | End: 2017-10-10 | Stop reason: SURG

## 2017-10-10 RX ORDER — PROMETHAZINE HYDROCHLORIDE 25 MG/1
25 TABLET ORAL ONCE AS NEEDED
Status: DISCONTINUED | OUTPATIENT
Start: 2017-10-10 | End: 2017-10-10

## 2017-10-10 RX ORDER — SODIUM CHLORIDE 0.9 % (FLUSH) 0.9 %
1-10 SYRINGE (ML) INJECTION AS NEEDED
Status: DISCONTINUED | OUTPATIENT
Start: 2017-10-10 | End: 2017-10-13 | Stop reason: HOSPADM

## 2017-10-10 RX ORDER — HYDRALAZINE HYDROCHLORIDE 20 MG/ML
5 INJECTION INTRAMUSCULAR; INTRAVENOUS
Status: DISCONTINUED | OUTPATIENT
Start: 2017-10-10 | End: 2017-10-10

## 2017-10-10 RX ORDER — FLUMAZENIL 0.1 MG/ML
0.2 INJECTION INTRAVENOUS AS NEEDED
Status: DISCONTINUED | OUTPATIENT
Start: 2017-10-10 | End: 2017-10-10

## 2017-10-10 RX ADMIN — MIDAZOLAM 2 MG: 1 INJECTION INTRAMUSCULAR; INTRAVENOUS at 06:59

## 2017-10-10 RX ADMIN — ONDANSETRON 4 MG: 2 INJECTION INTRAMUSCULAR; INTRAVENOUS at 10:45

## 2017-10-10 RX ADMIN — MORPHINE SULFATE 4 MG: 2 INJECTION, SOLUTION INTRAMUSCULAR; INTRAVENOUS at 23:17

## 2017-10-10 RX ADMIN — DOCUSATE SODIUM 100 MG: 100 CAPSULE, LIQUID FILLED ORAL at 17:32

## 2017-10-10 RX ADMIN — ATORVASTATIN CALCIUM 80 MG: 80 TABLET, FILM COATED ORAL at 20:19

## 2017-10-10 RX ADMIN — FENTANYL CITRATE 50 MCG: 50 INJECTION INTRAMUSCULAR; INTRAVENOUS at 11:24

## 2017-10-10 RX ADMIN — FAMOTIDINE 20 MG: 10 INJECTION, SOLUTION INTRAVENOUS at 06:59

## 2017-10-10 RX ADMIN — HYDROMORPHONE HYDROCHLORIDE 0.5 MG: 1 INJECTION, SOLUTION INTRAMUSCULAR; INTRAVENOUS; SUBCUTANEOUS at 11:32

## 2017-10-10 RX ADMIN — DEXAMETHASONE SODIUM PHOSPHATE 6 MG: 10 INJECTION INTRAMUSCULAR; INTRAVENOUS at 08:00

## 2017-10-10 RX ADMIN — NEOSTIGMINE METHYLSULFATE 2 MG: 1 INJECTION INTRAMUSCULAR; INTRAVENOUS; SUBCUTANEOUS at 10:45

## 2017-10-10 RX ADMIN — ROCURONIUM BROMIDE 50 MG: 10 INJECTION INTRAVENOUS at 07:40

## 2017-10-10 RX ADMIN — GLYCOPYRROLATE 0.4 MG: 0.2 INJECTION INTRAMUSCULAR; INTRAVENOUS at 10:45

## 2017-10-10 RX ADMIN — METFORMIN HYDROCHLORIDE 1000 MG: 1000 TABLET ORAL at 17:32

## 2017-10-10 RX ADMIN — CEFAZOLIN SODIUM 2 G: 2 INJECTION, SOLUTION INTRAVENOUS at 07:45

## 2017-10-10 RX ADMIN — MORPHINE SULFATE 4 MG: 4 INJECTION, SOLUTION INTRAMUSCULAR; INTRAVENOUS at 17:32

## 2017-10-10 RX ADMIN — PHENYLEPHRINE HYDROCHLORIDE 100 MCG: 10 INJECTION INTRAVENOUS at 07:35

## 2017-10-10 RX ADMIN — FENTANYL CITRATE 50 MCG: 50 INJECTION INTRAMUSCULAR; INTRAVENOUS at 09:29

## 2017-10-10 RX ADMIN — CEFAZOLIN SODIUM 2 G: 2 INJECTION, SOLUTION INTRAVENOUS at 17:32

## 2017-10-10 RX ADMIN — LIDOCAINE HYDROCHLORIDE 60 MG: 20 INJECTION, SOLUTION INFILTRATION; PERINEURAL at 07:29

## 2017-10-10 RX ADMIN — HYDROCODONE BITARTRATE AND ACETAMINOPHEN 2 TABLET: 7.5; 325 TABLET ORAL at 14:15

## 2017-10-10 RX ADMIN — METOPROLOL TARTRATE 25 MG: 25 TABLET ORAL at 20:18

## 2017-10-10 RX ADMIN — EPHEDRINE SULFATE 10 MG: 50 INJECTION INTRAMUSCULAR; INTRAVENOUS; SUBCUTANEOUS at 07:35

## 2017-10-10 RX ADMIN — HYDROMORPHONE HYDROCHLORIDE 0.4 MG: 2 INJECTION, SOLUTION INTRAMUSCULAR; INTRAVENOUS; SUBCUTANEOUS at 10:45

## 2017-10-10 RX ADMIN — PHENYLEPHRINE HYDROCHLORIDE 100 MCG: 10 INJECTION INTRAVENOUS at 09:50

## 2017-10-10 RX ADMIN — PROPOFOL 40 MG: 10 INJECTION, EMULSION INTRAVENOUS at 08:09

## 2017-10-10 RX ADMIN — LISINOPRIL AND HYDROCHLOROTHIAZIDE 1 TABLET: 10; 12.5 TABLET ORAL at 14:30

## 2017-10-10 RX ADMIN — FENTANYL CITRATE 50 MCG: 50 INJECTION INTRAMUSCULAR; INTRAVENOUS at 07:29

## 2017-10-10 RX ADMIN — PROPOFOL 130 MG: 10 INJECTION, EMULSION INTRAVENOUS at 07:29

## 2017-10-10 RX ADMIN — FENTANYL CITRATE 50 MCG: 50 INJECTION INTRAMUSCULAR; INTRAVENOUS at 06:59

## 2017-10-10 RX ADMIN — SODIUM CHLORIDE, POTASSIUM CHLORIDE, SODIUM LACTATE AND CALCIUM CHLORIDE 9 ML/HR: 600; 310; 30; 20 INJECTION, SOLUTION INTRAVENOUS at 06:59

## 2017-10-10 RX ADMIN — HEPARIN SODIUM 5000 UNITS: 5000 INJECTION, SOLUTION INTRAVENOUS; SUBCUTANEOUS at 07:18

## 2017-10-10 RX ADMIN — KETOROLAC TROMETHAMINE 15 MG: 30 INJECTION, SOLUTION INTRAMUSCULAR; INTRAVENOUS at 10:45

## 2017-10-10 RX ADMIN — ROCURONIUM BROMIDE 50 MG: 10 INJECTION INTRAVENOUS at 07:29

## 2017-10-10 RX ADMIN — HYDROCODONE BITARTRATE AND ACETAMINOPHEN 2 TABLET: 7.5; 325 TABLET ORAL at 20:19

## 2017-10-10 RX ADMIN — SENNOSIDES AND DOCUSATE SODIUM 2 TABLET: 8.6; 5 TABLET ORAL at 20:18

## 2017-10-10 RX ADMIN — FENTANYL CITRATE 50 MCG: 50 INJECTION INTRAMUSCULAR; INTRAVENOUS at 09:00

## 2017-10-10 RX ADMIN — PROPOFOL 30 MG: 10 INJECTION, EMULSION INTRAVENOUS at 08:15

## 2017-10-10 RX ADMIN — LABETALOL HYDROCHLORIDE 5 MG: 5 INJECTION, SOLUTION INTRAVENOUS at 10:54

## 2017-10-10 RX ADMIN — IPRATROPIUM BROMIDE AND ALBUTEROL SULFATE 3 ML: .5; 3 SOLUTION RESPIRATORY (INHALATION) at 15:37

## 2017-10-10 RX ADMIN — SODIUM CHLORIDE 75 ML/HR: 9 INJECTION, SOLUTION INTRAVENOUS at 11:47

## 2017-10-10 RX ADMIN — ROCURONIUM BROMIDE 50 MG: 10 INJECTION INTRAVENOUS at 08:09

## 2017-10-10 RX ADMIN — IPRATROPIUM BROMIDE AND ALBUTEROL SULFATE 3 ML: .5; 3 SOLUTION RESPIRATORY (INHALATION) at 20:21

## 2017-10-10 NOTE — NURSING NOTE
"Dr. Morataya at bedside for post-op chest x-ray.  Dr. Morataya stated \"looks good\" in regards to x-ray.  10/10/17 @1121    "

## 2017-10-10 NOTE — ANESTHESIA PREPROCEDURE EVALUATION
Anesthesia Evaluation     Patient summary reviewed and Nursing notes reviewed   NPO Solid Status: > 8 hours  NPO Liquid Status: > 2 hours     Airway   Mallampati: II  Neck ROM: full  no difficulty expected  Dental    (+) lower dentures and upper dentures    Pulmonary     breath sounds clear to auscultation  Cardiovascular     Rhythm: regular    (+) hypertension, hyperlipidemia      Neuro/Psych  GI/Hepatic/Renal/Endo    (+)  diabetes mellitus,     Musculoskeletal     Abdominal   (+) obese,    Substance History      OB/GYN          Other   (+) arthritis   history of cancer                                    Anesthesia Plan    ASA 3     general   (A line)  intravenous induction   Anesthetic plan and risks discussed with patient.

## 2017-10-10 NOTE — ANESTHESIA PROCEDURE NOTES
Airway  Urgency: elective    Date/Time: 10/10/2017 7:31 AM  Airway not difficult    General Information and Staff    Patient location during procedure: OR  Anesthesiologist: BRENDA KELLY  CRNA: CARYN TORRES    Indications and Patient Condition  Indications for airway management: airway protection    Preoxygenated: yes  MILS maintained throughout  Mask difficulty assessment: 2 - vent by mask + OA or adjuvant +/- NMBA    Final Airway Details  Final airway type: endotracheal airway      Successful airway: ETT - double lumen left  Cuffed: yes   Successful intubation technique: direct laryngoscopy  Endotracheal tube insertion site: oral  Blade: Astrid  Blade size: #3  ETT DL size: 35 fr  Cormack-Lehane Classification: grade I - full view of glottis  Placement verified by: chest auscultation, bronchoscopy and capnometry   Measured from: gums  ETT to gums (cm): 27  Number of attempts at approach: 1    Additional Comments  FOB placement of ETT after placement and again after positioning of pt.

## 2017-10-10 NOTE — ANESTHESIA PROCEDURE NOTES
Arterial Line    Patient location during procedure: holding area  Start time: 10/10/2017 6:50 AM  Stop Time:10/10/2017 6:55 AM       Line placed for hemodynamic monitoring.  Performed By   Anesthesiologist: BRENDA KELLY  Preanesthetic Checklist  Completed: patient identified, site marked, surgical consent, pre-op evaluation, timeout performed, IV checked, risks and benefits discussed and monitors and equipment checked  Arterial Line Prep   Sterile Tech: mask and cap  Prep: ChloraPrep  Patient monitoring: blood pressure monitoring, continuous pulse oximetry and EKG  Arterial Line Procedure   Laterality:right  Location:  radial artery  Catheter size: 20 G   Guidance: landmark technique  Number of attempts: 1  Successful placement: yes          Post Assessment   Dressing Type: occlusive dressing applied, secured with tape and wrist guard applied.   Complications no  Circ/Move/Sens Assessment: unchanged.   Patient Tolerance: patient tolerated the procedure well with no apparent complications

## 2017-10-10 NOTE — OP NOTE
Preoperative diagnosis:   Lung nodule [R91.1]  Postoperative diagnosis:  Post-Op Diagnosis Codes:     * Lung nodule [R91.1]  Left lower lobe adenocarcinoma in situ  Procedure:  LEFT THORACOSCOPY VIDEO ASSISTED.EXPLORATORY LEFT THORACOSCOPY WITH SUPERIOR SEGMENTECTOMY, COMPLETION LEFT LOWER LOBECTOMY EXPLORATORY BRONCHOSCOPY. SUBPLEURAL PAIN CATHETER INSERTION X2  Surgeon:  Wayne Morataya M.D.  Asst.:  Marlys Recinos CSA  Anesthesia:  General anesthesia  IV fluids:  800 cc   EBL:   100 cc   Urine output:  550 mL  Drain (s):  28 F Chest tube   Specimen:    ID Type Source Tests Collected by Time Destination   A : L10  Tissue Lymph Node TISSUE EXAM Wayne Morataya MD 10/10/2017 0920     B : L11 Tissue Lymph Node TISSUE EXAM Wayne Morataya MD 10/10/2017 0920     C : LLL Superior Segment/1.7 cm left lower lobe nodule ground glass opacity Tissue Lung, Left Lower Lobe TISSUE EXAM Wayne Morataya MD 10/10/2017 0924     D : Station 7  Tissue Lymph Node TISSUE EXAM Wayne Morataya MD 10/10/2017 0954     E : Left lower lobe  Tissue Lung, Left Lower Lobe TISSUE EXAM Wayne Morataya MD 10/10/2017 1016         Indications:  This is a 72-year-old female with a prior history of right middle lobe adenocarcinoma resected in 2009.  She presents with an enlarging left lower lobe groundglass opacity initially measuring 1.2 cm, then 1.5 cm and currently 1.7 cm on her most recent CT scan of the chest.  In light of this progressive changes in this groundglass opacity or concerns for malignancy or high.  We decided proceed with the above-mentioned procedure.  The risk, benefits and alternatives were discussed with the patient and included but are not limited to bleeding, infection, injury to lung, recurrence, cardiac risk and possible anesthesia related complications.  The patient understood these risk.  All her questions were answered to her satisfaction and consent was obtained.  Description of procedure:  The patient was  identified in the preoperative holding area and taken back to the operating room.  Both the operative site and the surgeon were identified by the nursing staff.  Both DVT prophylaxis and antibiotics were initiated.  The patient was sedated and a double-lumen endotracheal tube was inserted.  A flexible bronchoscopy was performed and there was no evidence of endoluminal obstruction or extrinsic airway compression.  The right middle lobe bronchial stump was intact with no evidence of any recurrent or residual disease.  The scope was withdrawn and the patient was then placed in a right lateral decubitus position with all pressure points and bony prominences padded.  The left arm was supported in a Ant arm support in a neutral position.  The left chest was then prepped and draped in the usual sterile fashion.  The lung was dropped by the anesthesia service.  A small incision was made at the ninth intercostal space posterior axillary line and the thoracoscope was inserted into the pleural space.  2 additional port sites were placed for passage of instruments, at the fifth intercostal space anterior axial line and another at the mid axillary line at the fourth intercostal space.  We attempted to identify the lesion within the left lower lobe several attempts to palpate this were unsuccessful.  Utilizing the CT scan images and report we were able to localize the area of interest to be the left lower lobe superior segment.  At this point we proceeded with a left lower lobe superior segmentectomy.  We began with dissection along the fissure.  The superior segmental pulmonary artery branch was identified, circumferentially ligated and transected with Endo GI stapler vascular load.  Dissection posteriorly identified the superior segmental bronchus this was circumferentially ligated and transected with Endo TEA stapler with green load.  The inferior pulmonary vein and superior segmental branch was circumferentially ligated and  transected with Endo GI stapler vascular.  We then inflated the lower lobe is identified and demarcated the non-aerated lung of the superior segment which was then sequentially transected with Endo TEA stapler with green load and Barbie-Strips.  Specimen was placed into a specimen bag and brought out through the utility incision.  I inspected the specimen and sent down to pathology to review.  Frozen section was nondiagnostic and they were unable to identify the 1.7 cm groundglass opacity which is been seen on her CT scan.  I was concerned that groundglass opacity may be within the remaining in situ lung.  This point I made a decision to proceed with a completion left lower lobectomy.  The inferior pulmonary vein was circumferentially ligated and transected with Endo TEA stapler with vascular load.  The remaining basilar pulmonary artery was transected in similar fashion with Endo TEA stapler vascular load.  The bronchus was occluded test inflated the upper lobe which expanded without difficulty and then we transected the bronchus with Endo TEA stapler.  The specimen was placed to the utility bag and brought out through the utility incision.  It was sent for frozen which confirmed the presence of carcinoma in situ, adenocarcinoma.  Additional lymph nodes at the station 7 as well as L 10 end L 11 were removed and sent for permanent evaluation.  We irrigated the chest cavity checked the bronchial stump no air leak was detected.  At this point an On-Q pain pump was placed with 2 subpleural pain catheters tunneled along the paravertebral gutter in a cephalad orientation.  Each of these was prepped with 0.25% Marcaine 10 cc per catheter.  Tisseel was dispense along the dissection bed.  A single 20 chest tube was inserted and secured in position.  Platelet rich plasma was dispense along the dissection bed as well.  The lung was reexpanded the scope was withdrawn.  The port sites were irrigated and closed with a formal  suture in multiple layers.  Sterile dressings were applied.  The patient was awakened and extubated in stable condition. At the end of the procedure the sponge needle and instrument count was correct ×2.  The patient was transported to the PACU in stable condition.  Findings:  Left lower lobe lung cancer, adenocarcinoma in situ final pathology and staging pending.

## 2017-10-10 NOTE — ANESTHESIA POSTPROCEDURE EVALUATION
Patient: Augustina Lobo    Procedure Summary     Date Anesthesia Start Anesthesia Stop Room / Location    10/10/17 0722 1102  [facility] / [facility]       Procedure Diagnosis Surgeon Provider    LEFT THORACOSCOPY VIDEO ASSISTED.EXPLORATORY LEFT THORACOSCOPY WITH SUPERIOR SEGMENTECTOMY, COMPLETION LEFT LOWER LOBECTOMY EXPLORATORY BRONCHOSCOPY. SUBPLEURAL PAIN CATHETER INSERTION X2 (Left Chest) Lung nodule  (Lung nodule [R91.1]) MD Harry Alonzo MD          Anesthesia Type: general  Last vitals  BP   131/71 (10/10/17 1135)    Temp   37.1 °C (98.8 °F) (10/10/17 1100)    Pulse   93 (10/10/17 1135)   Resp   16 (10/10/17 1135)    SpO2   98 % (10/10/17 1135)      Post Anesthesia Care and Evaluation    Patient location during evaluation: PACU  Patient participation: complete - patient participated  Level of consciousness: awake  Pain management: adequate  Airway patency: patent  Anesthetic complications: No anesthetic complications  PONV Status: none  Cardiovascular status: acceptable  Respiratory status: acceptable  Hydration status: acceptable

## 2017-10-10 NOTE — BRIEF OP NOTE
THORACOSCOPY VIDEO ASSISTED POSSIBLE THORACOTOMY  Progress Note    Augustina Lobo  10/10/2017    Pre-op Diagnosis:   Lung nodule [R91.1]       Post-Op Diagnosis Codes:     * Lung nodule [R91.1]    Procedure/CPT® Codes:      Procedure(s):  LEFT THORACOSCOPY VIDEO ASSISTED.EXPLORATORY LEFT THORACOSCOPY WITH SUPERIOR SEGMENTECTOMY, COMPLETION LEFT LOWER LOBECTOMY EXPLORATORY BRONCHOSCOPY. SUBPLEURAL PAIN CATHETER INSERTION X2    Surgeon(s):  Wayne Morataya MD    Anesthesia: General    Staff:   Circulator: Magda Aragon RN; Mariela Stokes RN  Perfusionist: Veronica Saba  Scrub Person: Buddy Webber  Assistant: Marlys Recinos CSA  Orientee: Mariela Lockett RN  IVF: 800 cc   Estimated Blood Loss: 100 mL    Urine Voided: 550 mL    Specimens:                  ID Type Source Tests Collected by Time Destination   A : L10  Tissue Lymph Node TISSUE EXAM Wayne Morataya MD 10/10/2017 0920    B : L11 Tissue Lymph Node TISSUE EXAM Wayne Morataya MD 10/10/2017 0920    C : LLL Superior Segment/1.7 cm left lower lobe nodule ground glass opacity Tissue Lung, Left Lower Lobe TISSUE EXAM Wayne Morataya MD 10/10/2017 0924    D : Station 7  Tissue Lymph Node TISSUE EXAM Wayne Morataya MD 10/10/2017 0954    E : Left lower lobe  Tissue Lung, Left Lower Lobe TISSUE EXAM Wayne Morataya MD 10/10/2017 1016          Drains:   Chest Tube 10/10/17 1024 chest tube placed by physician (Active)       Drain/Device Site 10/10/17 0940 Left chest analgesia infusion pump/device (Active)       Urethral Catheter 10/10/17 0745 100% silicone 16 10 10 (Active)           Findings: LLL Adenocarcinoma in situ     Complications: None       Wayne Morataya MD     Date: 10/10/2017  Time: 10:57 AM

## 2017-10-11 ENCOUNTER — APPOINTMENT (OUTPATIENT)
Dept: GENERAL RADIOLOGY | Facility: HOSPITAL | Age: 72
End: 2017-10-11

## 2017-10-11 LAB
ANION GAP SERPL CALCULATED.3IONS-SCNC: 12.2 MMOL/L
BUN BLD-MCNC: 11 MG/DL (ref 8–23)
BUN/CREAT SERPL: 17.2 (ref 7–25)
CALCIUM SPEC-SCNC: 8.2 MG/DL (ref 8.6–10.5)
CHLORIDE SERPL-SCNC: 100 MMOL/L (ref 98–107)
CO2 SERPL-SCNC: 25.8 MMOL/L (ref 22–29)
CREAT BLD-MCNC: 0.64 MG/DL (ref 0.57–1)
DEPRECATED RDW RBC AUTO: 46.3 FL (ref 37–54)
ERYTHROCYTE [DISTWIDTH] IN BLOOD BY AUTOMATED COUNT: 13.2 % (ref 11.7–13)
GFR SERPL CREATININE-BSD FRML MDRD: 91 ML/MIN/1.73
GLUCOSE BLD-MCNC: 130 MG/DL (ref 65–99)
GLUCOSE BLDC GLUCOMTR-MCNC: 133 MG/DL (ref 70–130)
GLUCOSE BLDC GLUCOMTR-MCNC: 178 MG/DL (ref 70–130)
GLUCOSE BLDC GLUCOMTR-MCNC: 184 MG/DL (ref 70–130)
GLUCOSE BLDC GLUCOMTR-MCNC: 203 MG/DL (ref 70–130)
HCT VFR BLD AUTO: 33.7 % (ref 35.6–45.5)
HGB BLD-MCNC: 10.8 G/DL (ref 11.9–15.5)
MCH RBC QN AUTO: 30.6 PG (ref 26.9–32)
MCHC RBC AUTO-ENTMCNC: 32 G/DL (ref 32.4–36.3)
MCV RBC AUTO: 95.5 FL (ref 80.5–98.2)
PLATELET # BLD AUTO: 223 10*3/MM3 (ref 140–500)
PMV BLD AUTO: 10.1 FL (ref 6–12)
POTASSIUM BLD-SCNC: 4.4 MMOL/L (ref 3.5–5.2)
RBC # BLD AUTO: 3.53 10*6/MM3 (ref 3.9–5.2)
SODIUM BLD-SCNC: 138 MMOL/L (ref 136–145)
WBC NRBC COR # BLD: 8.61 10*3/MM3 (ref 4.5–10.7)

## 2017-10-11 PROCEDURE — 97162 PT EVAL MOD COMPLEX 30 MIN: CPT

## 2017-10-11 PROCEDURE — 71010 HC CHEST PA OR AP: CPT

## 2017-10-11 PROCEDURE — 25010000003 CEFAZOLIN IN DEXTROSE 2-4 GM/100ML-% SOLUTION: Performed by: THORACIC SURGERY (CARDIOTHORACIC VASCULAR SURGERY)

## 2017-10-11 PROCEDURE — 97110 THERAPEUTIC EXERCISES: CPT

## 2017-10-11 PROCEDURE — 94799 UNLISTED PULMONARY SVC/PX: CPT

## 2017-10-11 PROCEDURE — 25010000002 HEPARIN (PORCINE) PER 1000 UNITS: Performed by: THORACIC SURGERY (CARDIOTHORACIC VASCULAR SURGERY)

## 2017-10-11 PROCEDURE — 25010000002 MORPHINE SULFATE (PF) 2 MG/ML SOLUTION: Performed by: THORACIC SURGERY (CARDIOTHORACIC VASCULAR SURGERY)

## 2017-10-11 PROCEDURE — 85027 COMPLETE CBC AUTOMATED: CPT | Performed by: THORACIC SURGERY (CARDIOTHORACIC VASCULAR SURGERY)

## 2017-10-11 PROCEDURE — 82962 GLUCOSE BLOOD TEST: CPT

## 2017-10-11 PROCEDURE — 99024 POSTOP FOLLOW-UP VISIT: CPT | Performed by: NURSE PRACTITIONER

## 2017-10-11 PROCEDURE — 80048 BASIC METABOLIC PNL TOTAL CA: CPT | Performed by: THORACIC SURGERY (CARDIOTHORACIC VASCULAR SURGERY)

## 2017-10-11 RX ORDER — HYDROCODONE BITARTRATE AND ACETAMINOPHEN 7.5; 325 MG/1; MG/1
1 TABLET ORAL EVERY 4 HOURS PRN
Status: DISCONTINUED | OUTPATIENT
Start: 2017-10-11 | End: 2017-10-13 | Stop reason: HOSPADM

## 2017-10-11 RX ORDER — HYDROCODONE BITARTRATE AND ACETAMINOPHEN 7.5; 325 MG/1; MG/1
2 TABLET ORAL EVERY 4 HOURS PRN
Status: DISCONTINUED | OUTPATIENT
Start: 2017-10-11 | End: 2017-10-13 | Stop reason: HOSPADM

## 2017-10-11 RX ADMIN — OXYCODONE HYDROCHLORIDE AND ACETAMINOPHEN 500 MG: 500 TABLET ORAL at 08:47

## 2017-10-11 RX ADMIN — HEPARIN SODIUM 5000 UNITS: 5000 INJECTION, SOLUTION INTRAVENOUS; SUBCUTANEOUS at 13:45

## 2017-10-11 RX ADMIN — HYDROCODONE BITARTRATE AND ACETAMINOPHEN 2 TABLET: 7.5; 325 TABLET ORAL at 00:19

## 2017-10-11 RX ADMIN — IPRATROPIUM BROMIDE AND ALBUTEROL SULFATE 3 ML: .5; 3 SOLUTION RESPIRATORY (INHALATION) at 21:43

## 2017-10-11 RX ADMIN — PIOGLITAZONE 15 MG: 15 TABLET ORAL at 06:14

## 2017-10-11 RX ADMIN — IPRATROPIUM BROMIDE AND ALBUTEROL SULFATE 3 ML: .5; 3 SOLUTION RESPIRATORY (INHALATION) at 15:15

## 2017-10-11 RX ADMIN — DOCUSATE SODIUM 100 MG: 100 CAPSULE, LIQUID FILLED ORAL at 17:10

## 2017-10-11 RX ADMIN — HEPARIN SODIUM 5000 UNITS: 5000 INJECTION, SOLUTION INTRAVENOUS; SUBCUTANEOUS at 06:13

## 2017-10-11 RX ADMIN — METOPROLOL TARTRATE 25 MG: 25 TABLET ORAL at 20:17

## 2017-10-11 RX ADMIN — MORPHINE SULFATE 2 MG: 2 INJECTION, SOLUTION INTRAMUSCULAR; INTRAVENOUS at 17:10

## 2017-10-11 RX ADMIN — METFORMIN HYDROCHLORIDE 1000 MG: 1000 TABLET ORAL at 17:10

## 2017-10-11 RX ADMIN — CITALOPRAM 20 MG: 20 TABLET, FILM COATED ORAL at 06:15

## 2017-10-11 RX ADMIN — HYDROCODONE BITARTRATE AND ACETAMINOPHEN 2 TABLET: 7.5; 325 TABLET ORAL at 09:20

## 2017-10-11 RX ADMIN — METFORMIN HYDROCHLORIDE 1000 MG: 1000 TABLET ORAL at 08:47

## 2017-10-11 RX ADMIN — LISINOPRIL AND HYDROCHLOROTHIAZIDE 1 TABLET: 10; 12.5 TABLET ORAL at 06:14

## 2017-10-11 RX ADMIN — SODIUM CHLORIDE 75 ML/HR: 9 INJECTION, SOLUTION INTRAVENOUS at 09:12

## 2017-10-11 RX ADMIN — HYDROCODONE BITARTRATE AND ACETAMINOPHEN 2 TABLET: 7.5; 325 TABLET ORAL at 05:34

## 2017-10-11 RX ADMIN — HYDROCODONE BITARTRATE AND ACETAMINOPHEN 1 TABLET: 7.5; 325 TABLET ORAL at 13:46

## 2017-10-11 RX ADMIN — METOPROLOL TARTRATE 25 MG: 25 TABLET ORAL at 08:49

## 2017-10-11 RX ADMIN — Medication 1 TABLET: at 11:13

## 2017-10-11 RX ADMIN — GLIPIZIDE 10 MG: 10 TABLET ORAL at 06:14

## 2017-10-11 RX ADMIN — MORPHINE SULFATE 4 MG: 2 INJECTION, SOLUTION INTRAMUSCULAR; INTRAVENOUS at 23:37

## 2017-10-11 RX ADMIN — IPRATROPIUM BROMIDE AND ALBUTEROL SULFATE 3 ML: .5; 3 SOLUTION RESPIRATORY (INHALATION) at 06:54

## 2017-10-11 RX ADMIN — HEPARIN SODIUM 5000 UNITS: 5000 INJECTION, SOLUTION INTRAVENOUS; SUBCUTANEOUS at 20:18

## 2017-10-11 RX ADMIN — HYDROCODONE BITARTRATE AND ACETAMINOPHEN 2 TABLET: 7.5; 325 TABLET ORAL at 20:18

## 2017-10-11 RX ADMIN — DOCUSATE SODIUM 100 MG: 100 CAPSULE, LIQUID FILLED ORAL at 08:49

## 2017-10-11 RX ADMIN — POLYETHYLENE GLYCOL 3350 17 G: 17 POWDER, FOR SOLUTION ORAL at 08:47

## 2017-10-11 RX ADMIN — CEFAZOLIN SODIUM 2 G: 2 INJECTION, SOLUTION INTRAVENOUS at 00:20

## 2017-10-11 NOTE — NURSING NOTE
Spoke with Dr. Morataya via telephone, pt has home med victoza, new order for victoza 1.8, will put in order, telephone readback and verified

## 2017-10-11 NOTE — PROGRESS NOTES
Discharge Planning Assessment  [facility]     Patient Name: Augustina Lobo  MRN: [id number]  Today's Date: 10/11/2017    Admit Date: 10/10/2017          Discharge Needs Assessment       10/11/17 1309    Living Environment    Lives With alone    Living Arrangements house    Home Accessibility stairs within home   basement    Transportation Available car;family or friend will provide    Living Environment    Provides Primary Care For no one    Quality Of Family Relationships supportive    Discharge Needs Assessment    Equipment Currently Used at Home none            Discharge Plan       10/11/17 1310    Case Management/Social Work Plan    Plan Home    Patient/Family In Agreement With Plan yes    Additional Comments Met with pt and daughter (Jennifer) at bedside.  Introduced self, explained CCP role, facesheet verified.  Pt states she lives in house alone.  Denies use of any DME.  Has never used HH or SNU in past.  Plans to return home and daughter Jennifer plans to stay with patient at discharge.  Pt concerned about cost of Victoza, will check financial programs.   LORIE Flores RN        Discharge Placement     No information found                Demographic Summary       10/11/17 1307    Referral Information    Admission Type inpatient    Arrived From home or self-care    Referral Source admission list    Reason For Consult discharge planning    Contact Information    Permission Granted to Share Information With family/designee   Cheri Lazaro (daughter) [phone]    Primary Care Physician Information    Name Mariam Nunez             Functional Status       10/11/17 1308    Functional Status Current    Ambulation 2-->assistive person    Transferring 2-->assistive person    Toileting 2-->assistive person    Bathing 2-->assistive person    Dressing 2-->assistive person    Eating 0-->independent    Communication 0-->understands/communicates without difficulty    Functional Status Prior    Ambulation 0-->independent     Transferring 0-->independent    Toileting 0-->independent    Bathing 0-->independent    Dressing 0-->independent    Eating 0-->independent    Communication 0-->understands/communicates without difficulty    Cognitive/Perceptual/Developmental    Current Mental Status/Cognitive Functioning no deficits noted            Psychosocial     None            Abuse/Neglect     None            Legal     None            Substance Abuse     None            Patient Forms     None          Pati Flores

## 2017-10-11 NOTE — PLAN OF CARE
Problem: Patient Care Overview (Adult)  Goal: Plan of Care Review    10/11/17 1000   Outcome Evaluation   Outcome Summary/Follow up Plan Pt POD1 VAT. Pt previously independent. Pt demonstrates decreased dynamic balance while walking. Encouraged to ambulate with nursing. Will progress gait training/balance training/stair training. Will progress as tolerated. ANticipate home with assist when medically ready.          Problem: Inpatient Physical Therapy  Goal: Bed Mobility Goal LTG- PT    10/11/17 1000   Bed Mobility PT LTG   Bed Mobility PT LTG, Date Established 10/11/17   Bed Mobility PT LTG, Time to Achieve 1 wk   Bed Mobility PT LTG, Activity Type all bed mobility   Bed Mobility PT LTG, [place] Level independent       Goal: Transfer Training Goal 1 LTG- PT    10/11/17 1000   Transfer Training PT LTG   Transfer Training PT LTG, Date Established 10/11/17   Transfer Training PT LTG, Time to Achieve 1 wk   Transfer Training PT LTG, Activity Type all transfers   Transfer Training PT LTG, [place] Level independent       Goal: Gait Training Goal LTG- PT    10/11/17 1000   Gait Training PT LTG   Gait Training Goal PT LTG, Date Established 10/11/17   Gait Training Goal PT LTG, Time to Achieve 1 wk   Gait Training Goal PT LTG, [place] Level independent   Gait Training Goal PT LTG, Distance to Achieve 150       Goal: Stair Training Goal LTG- PT    10/11/17 1000   Stair Training PT LTG   Stair Training Goal PT LTG, Date Established 10/11/17   Stair Training Goal PT LTG, Time to Achieve 1 wk   Stair Training Goal PT LTG, Number of Steps 12   Stair Training Goal PT LTG, [place] Level supervision required   Stair Training Goal PT LTG, Assist Device 2 handrails

## 2017-10-11 NOTE — PLAN OF CARE
Problem: Patient Care Overview (Adult)  Goal: Plan of Care Review  Outcome: Ongoing (interventions implemented as appropriate)    10/11/17 0318   Coping/Psychosocial Response Interventions   Plan Of Care Reviewed With patient;daughter   Patient Care Overview   Progress improving         10/11/17 0318   Coping/Psychosocial Response Interventions   Plan Of Care Reviewed With patient;daughter   Patient Care Overview   Progress progress towards functional goals is fair         Problem: Chest Tube Drainage Device (Adult)  Goal: Signs and Symptoms of Listed Potential Problems Will be Absent or Manageable (Chest Tube Drainage Device)  Outcome: Ongoing (interventions implemented as appropriate)    10/11/17 0318   Chest Tube Drainage Device   Problems Assessed (Chest Tube Drainage Device) pain   Problems Present (Chest Tube Drainage Device) pain         Problem: Pain, Acute (Adult)  Goal: Identify Related Risk Factors and Signs and Symptoms  Outcome: Ongoing (interventions implemented as appropriate)    10/11/17 0318   Pain, Acute   Related Risk Factors (Acute Pain) surgery   Signs and Symptoms (Acute Pain) fatigue/weakness

## 2017-10-11 NOTE — THERAPY EVALUATION
Acute Care - Physical Therapy Initial Evaluation  [facility]     Patient Name: Augustina Loob  : 1945  MRN: [id number]  Today's Date: 10/11/2017                Admit Date: 10/10/2017     Visit Dx:    ICD-10-CM ICD-9-CM   1. Lung nodule, solitary R91.1 793.11   2. Lung nodule R91.1 793.11   3. Generalized weakness R53.1 780.79     Patient Active Problem List   Diagnosis   • History of lung cancer   • Ground glass opacity present on imaging of lung   • Lung nodule   • Lung nodule, solitary     Past Medical History:   Diagnosis Date   • Arthritis    • Diabetes mellitus    • Elevated cholesterol    • History of diverticulitis    • Hypertension    • Lung cancer 2009    Resected Right Lobectomy per Dr. Morataya     Past Surgical History:   Procedure Laterality Date   • APPENDECTOMY     • BUNIONECTOMY     • DILATATION AND CURETTAGE      Times 2   • EYE SURGERY     • HYSTERECTOMY     • LUNG LOBECTOMY Right    • THORACOSCOPY Left 10/10/2017    Procedure: LEFT THORACOSCOPY VIDEO ASSISTED.EXPLORATORY LEFT THORACOSCOPY WITH SUPERIOR SEGMENTECTOMY, COMPLETION LEFT LOWER LOBECTOMY EXPLORATORY BRONCHOSCOPY. SUBPLEURAL PAIN CATHETER INSERTION X2;  Surgeon: Wayne Morataya MD;  Location: [facility] MAIN OR;  Service:    • TONSILLECTOMY            PT ASSESSMENT (last 72 hours)      PT Evaluation       10/11/17 0955 10/10/17 0614    Rehab Evaluation    Document Type evaluation  -MG     Subjective Information no complaints  -MG     Patient Effort, Rehab Treatment excellent  -MG     General Information    General Observations supine in bed, agreeable to therapy   -MG     Precautions/Limitations fall precautions   chest tube  -MG     Prior Level of Function independent:  -MG     Equipment Currently Used at Home none  -MG none  -SP    Plans/Goals Discussed With patient  -MG     Barriers to Rehab none identified  -MG     Living Environment    Lives With  alone  -SP    Living Arrangements  house  -SP    Home Accessibility  stairs  within home  -SP    Stair Railings at Home  inside, present at both sides  -SP    Type of Financial/Environmental Concern  none  -SP    Transportation Available  car;family or friend will provide  -SP    Living Environment Comment stairs to basement   -MG     Clinical Impression    Patient/Family Goals Statement return home   -MG     Criteria for Skilled Therapeutic Interventions Met yes;treatment indicated  -MG     Impairments Found (describe specific impairments) aerobic capacity/endurance;gait, locomotion, and balance;muscle performance  -MG     Rehab Potential good, to achieve stated therapy goals  -MG     Vital Signs    Posttreatment Heart Rate (beats/min) 105  -MG     Pre SpO2 (%) 98  -MG     O2 Delivery Pre Treatment room air  -MG     O2 Delivery Intra Treatment room air  -MG     Post SpO2 (%) 94  -MG     Pain Assessment    Pain Assessment No/denies pain  -MG     Cognitive Assessment/Intervention    Current Cognitive/Communication Assessment functional  -MG     Orientation Status oriented x 4  -MG     Follows Commands/Answers Questions 100% of the time  -MG     Personal Safety WNL/WFL  -MG     Personal Safety Interventions fall prevention program maintained  -MG     ROM (Range of Motion)    General ROM no range of motion deficits identified  -MG     MMT (Manual Muscle Testing)    General MMT Assessment --   generalized weakness  -MG     Bed Mobility, Assessment/Treatment    Bed Mob, Supine to Sit, Cross supervision required  -MG     Bed Mob, Sit to Supine, Cross supervision required  -MG     Transfer Assessment/Treatment    Transfers, Sit-Stand Cross contact guard assist;1 person + 1 person to manage equipment  -MG     Transfers, Stand-Sit Cross stand by assist;1 person + 1 person to manage equipment  -MG     Transfers, Sit-Stand-Sit, Assist Device --   no AD  -MG     Transfer, Impairments strength decreased  -MG     Gait Assessment/Treatment    Gait, Cross Level contact  guard assist;1 person + 1 person to manage equipment  -MG     Gait, Assistive Device --   no AD  -MG     Gait, Distance (Feet) 100  -MG     Gait, Gait Pattern Analysis swing-through gait  -MG     Gait, Gait Deviations --   occasional loss of balance to R, holds onto hallway rail   -MG     Gait, Safety Issues balance decreased during turns  -MG     Gait, Impairments impaired balance  -MG     Positioning and Restraints    Pre-Treatment Position in bed  -MG     Post Treatment Position bed  -MG     In Bed call light within reach;encouraged to call for assist;with family/caregiver  -MG       User Key  (r) = Recorded By, (t) = Taken By, (c) = Cosigned By    Initials Name Provider Type    SP Kristina Hills, RN Registered Nurse    MG Megan Gosselin, PT Physical Therapist          Physical Therapy Education     Title: PT OT SLP Therapies (Active)     Topic: Physical Therapy (Active)     Point: Mobility training (Done)    Learning Progress Summary    Learner Readiness Method Response Comment Documented by Status   Patient Acceptance E VU,NR  MG 10/11/17 0959 Done               Point: Body mechanics (Done)    Learning Progress Summary    Learner Readiness Method Response Comment Documented by Status   Patient Acceptance E VU,NR  MG 10/11/17 0959 Done               Point: Precautions (Done)    Learning Progress Summary    Learner Readiness Method Response Comment Documented by Status   Patient Acceptance E VU,NR  MG 10/11/17 0959 Done                      User Key     Initials Effective Dates Name Provider Type Discipline     09/13/17 -  Megan Gosselin, PT Physical Therapist PT                PT Recommendation and Plan  Anticipated Discharge Disposition: home  Planned Therapy Interventions: balance training, gait training, home exercise program, bed mobility training, patient/family education, stair training, transfer training, strengthening  PT Frequency: daily  Plan of Care Review  Outcome Summary/Follow up Plan: Pt POD1  VAT. Pt previously independent. Pt demonstrates decreased dynamic balance while walking. Encouraged to ambulate with nursing. Will progress gait training/balance training/stair training. Will progress as tolerated. ANticipate home with assist when medically ready.           IP PT Goals       10/11/17 1000          Bed Mobility PT LTG    Bed Mobility PT LTG, Date Established 10/11/17  -MG      Bed Mobility PT LTG, Time to Achieve 1 wk  -MG      Bed Mobility PT LTG, Activity Type all bed mobility  -MG      Bed Mobility PT LTG, [place] Level independent  -MG      Transfer Training PT LTG    Transfer Training PT LTG, Date Established 10/11/17  -MG      Transfer Training PT LTG, Time to Achieve 1 wk  -MG      Transfer Training PT LTG, Activity Type all transfers  -MG      Transfer Training PT LTG, [place] Level independent  -MG      Gait Training PT LTG    Gait Training Goal PT LTG, Date Established 10/11/17  -MG      Gait Training Goal PT LTG, Time to Achieve 1 wk  -MG      Gait Training Goal PT LTG, [place] Level independent  -MG      Gait Training Goal PT LTG, Distance to Achieve 150  -MG      Stair Training PT LTG    Stair Training Goal PT LTG, Date Established 10/11/17  -MG      Stair Training Goal PT LTG, Time to Achieve 1 wk  -MG      Stair Training Goal PT LTG, Number of Steps 12  -MG      Stair Training Goal PT LTG, [place] Level supervision required  -MG      Stair Training Goal PT LTG, Assist Device 2 handrails  -MG        User Key  (r) = Recorded By, (t) = Taken By, (c) = Cosigned By    Initials Name Provider Type    MG Megan Gosselin, PT Physical Therapist                Outcome Measures       10/11/17 1000          How much help from another person do you currently need...    Turning from your back to your side while in flat bed without using bedrails? 4  -MG      Moving from lying on back to sitting on the side of a flat bed without bedrails? 4  -MG      Moving to and from a bed to a  chair (including a wheelchair)? 3  -MG      Standing up from a chair using your arms (e.g., wheelchair, bedside chair)? 3  -MG      Climbing 3-5 steps with a railing? 2  -MG      To walk in hospital room? 3  -MG      AM-PAC 6 Clicks Score 19  -MG      Functional Assessment    Outcome Measure Options AM-PAC 6 Clicks Basic Mobility (PT)  -MG        User Key  (r) = Recorded By, (t) = Taken By, (c) = Cosigned By    Initials Name Provider Type    MG Megan Gosselin, PT Physical Therapist           Time Calculation:         PT Charges       10/11/17 1005          Time Calculation    Start Time 0943  -MG      Stop Time 0953  -MG      Time Calculation (min) 10 min  -MG      PT Received On 10/11/17  -MG      PT - Next Appointment 10/12/17  -MG      PT Goal Re-Cert Due Date 10/18/17  -MG        User Key  (r) = Recorded By, (t) = Taken By, (c) = Cosigned By    Initials Name Provider Type    MG Megan Gosselin, PT Physical Therapist          Therapy Charges for Today     Code Description Service Date Service Provider Modifiers Qty    41411996016 HC PT EVAL MOD COMPLEXITY 2 10/11/2017 Megan Gosselin, PT GP 1    65640586479 HC PT THER PROC EA 15 MIN 10/11/2017 Megan Gosselin, PT GP 1    07004350734 HC PT THER SUPP EA 15 MIN 10/11/2017 Megan Gosselin, PT GP 1          PT G-Codes  Outcome Measure Options: AM-PAC 6 Clicks Basic Mobility (PT)      Megan Gosselin, PT  10/11/2017

## 2017-10-12 ENCOUNTER — APPOINTMENT (OUTPATIENT)
Dept: GENERAL RADIOLOGY | Facility: HOSPITAL | Age: 72
End: 2017-10-12

## 2017-10-12 LAB
GLUCOSE BLDC GLUCOMTR-MCNC: 163 MG/DL (ref 70–130)
GLUCOSE BLDC GLUCOMTR-MCNC: 166 MG/DL (ref 70–130)
GLUCOSE BLDC GLUCOMTR-MCNC: 195 MG/DL (ref 70–130)
GLUCOSE BLDC GLUCOMTR-MCNC: 217 MG/DL (ref 70–130)

## 2017-10-12 PROCEDURE — 82962 GLUCOSE BLOOD TEST: CPT

## 2017-10-12 PROCEDURE — 71010 HC CHEST PA OR AP: CPT

## 2017-10-12 PROCEDURE — 25010000002 HEPARIN (PORCINE) PER 1000 UNITS: Performed by: THORACIC SURGERY (CARDIOTHORACIC VASCULAR SURGERY)

## 2017-10-12 PROCEDURE — 99024 POSTOP FOLLOW-UP VISIT: CPT | Performed by: NURSE PRACTITIONER

## 2017-10-12 PROCEDURE — 94799 UNLISTED PULMONARY SVC/PX: CPT

## 2017-10-12 RX ORDER — FAMOTIDINE 20 MG/1
20 TABLET, FILM COATED ORAL EVERY 12 HOURS SCHEDULED
Status: DISCONTINUED | OUTPATIENT
Start: 2017-10-12 | End: 2017-10-13 | Stop reason: HOSPADM

## 2017-10-12 RX ORDER — FAMOTIDINE 10 MG/ML
20 INJECTION, SOLUTION INTRAVENOUS EVERY 12 HOURS SCHEDULED
Status: DISCONTINUED | OUTPATIENT
Start: 2017-10-12 | End: 2017-10-13 | Stop reason: HOSPADM

## 2017-10-12 RX ADMIN — HEPARIN SODIUM 5000 UNITS: 5000 INJECTION, SOLUTION INTRAVENOUS; SUBCUTANEOUS at 06:31

## 2017-10-12 RX ADMIN — HYDROCODONE BITARTRATE AND ACETAMINOPHEN 2 TABLET: 7.5; 325 TABLET ORAL at 06:34

## 2017-10-12 RX ADMIN — PIOGLITAZONE 15 MG: 15 TABLET ORAL at 06:33

## 2017-10-12 RX ADMIN — DOCUSATE SODIUM 100 MG: 100 CAPSULE, LIQUID FILLED ORAL at 09:10

## 2017-10-12 RX ADMIN — HYDROCODONE BITARTRATE AND ACETAMINOPHEN 2 TABLET: 7.5; 325 TABLET ORAL at 01:11

## 2017-10-12 RX ADMIN — METOPROLOL TARTRATE 25 MG: 25 TABLET ORAL at 09:10

## 2017-10-12 RX ADMIN — FAMOTIDINE 20 MG: 20 TABLET, FILM COATED ORAL at 21:36

## 2017-10-12 RX ADMIN — DOCUSATE SODIUM 100 MG: 100 CAPSULE, LIQUID FILLED ORAL at 17:27

## 2017-10-12 RX ADMIN — DOCUSATE SODIUM 100 MG: 100 CAPSULE, LIQUID FILLED ORAL at 04:21

## 2017-10-12 RX ADMIN — HEPARIN SODIUM 5000 UNITS: 5000 INJECTION, SOLUTION INTRAVENOUS; SUBCUTANEOUS at 14:25

## 2017-10-12 RX ADMIN — HYDROCODONE BITARTRATE AND ACETAMINOPHEN 1 TABLET: 7.5; 325 TABLET ORAL at 14:25

## 2017-10-12 RX ADMIN — Medication 1 TABLET: at 10:49

## 2017-10-12 RX ADMIN — FAMOTIDINE 20 MG: 20 TABLET, FILM COATED ORAL at 14:46

## 2017-10-12 RX ADMIN — IPRATROPIUM BROMIDE AND ALBUTEROL SULFATE 3 ML: .5; 3 SOLUTION RESPIRATORY (INHALATION) at 07:09

## 2017-10-12 RX ADMIN — METFORMIN HYDROCHLORIDE 1000 MG: 1000 TABLET ORAL at 09:10

## 2017-10-12 RX ADMIN — GLIPIZIDE 10 MG: 10 TABLET ORAL at 06:33

## 2017-10-12 RX ADMIN — METOPROLOL TARTRATE 25 MG: 25 TABLET ORAL at 21:34

## 2017-10-12 RX ADMIN — POLYETHYLENE GLYCOL 3350 17 G: 17 POWDER, FOR SOLUTION ORAL at 04:20

## 2017-10-12 RX ADMIN — LISINOPRIL AND HYDROCHLOROTHIAZIDE 1 TABLET: 10; 12.5 TABLET ORAL at 10:48

## 2017-10-12 RX ADMIN — BISACODYL 10 MG: 10 SUPPOSITORY RECTAL at 14:25

## 2017-10-12 RX ADMIN — CITALOPRAM 20 MG: 20 TABLET, FILM COATED ORAL at 06:34

## 2017-10-12 RX ADMIN — HEPARIN SODIUM 5000 UNITS: 5000 INJECTION, SOLUTION INTRAVENOUS; SUBCUTANEOUS at 21:36

## 2017-10-12 RX ADMIN — METFORMIN HYDROCHLORIDE 1000 MG: 1000 TABLET ORAL at 17:27

## 2017-10-12 RX ADMIN — OXYCODONE HYDROCHLORIDE AND ACETAMINOPHEN 500 MG: 500 TABLET ORAL at 09:10

## 2017-10-12 NOTE — PROGRESS NOTES
"    Chief Complaint: Postoperative management  S/P: LEFT THORACOSCOPY VIDEO ASSISTED.EXPLORATORY LEFT THORACOSCOPY WITH SUPERIOR SEGMENTECTOMY, COMPLETION LEFT LOWER LOBECTOMY EXPLORATORY BRONCHOSCOPY. SUBPLEURAL PAIN CATHETER INSERTION X2  POD # 2    Subjective:  Symptoms:  Stable.  She reports shortness of breath (with exertion and decreases with rest).    Diet:  Adequate intake.  No nausea.    Activity level: Normal.    Pain:  She complains of pain that is mild.  Pain is well controlled and requiring pain medication.        Vital Signs:  Temp:  [98 °F (36.7 °C)-98.7 °F (37.1 °C)] 98.5 °F (36.9 °C)  Heart Rate:  [] 91  Resp:  [16-24] 16  BP: ()/(55-69) 117/60    Intake & Output (last day)       10/11 0701 - 10/12 0700 10/12 0701 - 10/13 0700    P.O. 1020     I.V. (mL/kg)      IV Piggyback      Total Intake(mL/kg) 1020 (15.5)     Urine (mL/kg/hr) 2275 (1.4)     Other 495 (0.3)     Stool 0 (0)     Blood      Wound      Total Output 2770      Net -1750            Unmeasured Stool Occurrence 1 x           Objective:  General Appearance:  Comfortable and in no acute distress.    Vital signs: (most recent): Blood pressure 117/60, pulse 91, temperature 98.5 °F (36.9 °C), temperature source Oral, resp. rate 16, height 61.5\" (156.2 cm), weight 145 lb (65.8 kg), SpO2 94 %.  Vital signs are normal.  No fever.    Output: Producing urine and no stool output (positive flatus).    Lungs:  Normal respiratory rate and normal effort.  There are decreased breath sounds.    Heart: Normal rate.  Regular rhythm.  No murmur.   Abdomen: Abdomen is soft and non-distended.    Extremities: Normal range of motion.  There is no dependent edema.    Neurological: Patient is alert and oriented to person, place and time.    Skin:  Warm and dry.  (Incision sites are C/D/I)            Chest tube:   Site: Left, Clean, Dry and Intact  Suction: waterseal  Air Leak: negative  Level: 830  24 Hour Total: 495 (30 ml in last 3 hours)    Results " Review:     I reviewed the patient's new clinical results.  I reviewed the patient's new imaging results and agree with the interpretation.    Imaging Results (last 24 hours)     Procedure Component Value Units Date/Time    XR Chest 1 View [[id number]] Collected:  10/12/17 0736     Updated:  10/12/17 0737    Narrative:       PORTABLE AP SEMIERECT CHEST DATED 10/12/2017 AT 0510 HOURS     CLINICAL HISTORY: 72-year-old female 2 days postop left video-assisted  thoracoscopy with left lower lobectomy for adenocarcinoma.     FINDINGS: When compared to the most recent prior chest radiograph, the  postoperative portable AP semierect projection of 10/11/2017 at 0516  hours, there is increased patchy atelectasis or infiltrate in the medial  basilar left lung with persistent elevation of left hemidiaphragm  compatible with partial resection of left lung. The costophrenic angles  are sharp. Trace subcutaneous air in the left chest wall is present.  Left chest tube remains with its tip at the medial left upper chest. No  pneumothorax is seen. A 1-2 mm trace of left apical pneumothorax is not  excluded, but there are multiple overlapping silhouettes of bony  framework at the left apex. Crowded markings are present at the right  lower lung zone. The heart is within normal limits of caliber. Aortic  calcification is again demonstrated. No acute change in bony thorax is  seen. There is developing gaseous distention or dilatation of the  stomach. Monitoring lead wires are present.     CONCLUSION: Minimal if any left apical pneumothorax is seen with left  chest tube remaining. Increased hypoaeration changes in the lung bases,  particularly the left.                   Lab Results:     Lab Results (last 24 hours)     Procedure Component Value Units Date/Time    POC Glucose Fingerstick [[id number]]  (Abnormal) Collected:  10/11/17 1131    Specimen:  Blood Updated:  10/11/17 1134     Glucose 184 (H) mg/dL     Narrative:       Meter:  [id number] : [id number] Kristi Osborne    Tissue Pathology Exam - Tissue, Lung, Left Lower Lobe [[id number]] Collected:  10/10/17 0920    Specimen:  Tissue from Lymph Node; Tissue from Lymph Node; Tissue from Lung, Left Lower Lobe; Tissue from Lymph Node; Tissue from Lung, Left Lower Lobe Updated:  10/11/17 1442     Case Report --     Surgical Pathology Report                         Case: [id number]                                  Authorizing Provider:  Wayne Morataya MD       Collected:           10/10/2017 09:24 AM          Ordering Location:     [facility]  Received:            10/10/2017 09:33 AM                                 MAIN OR                                                                      Pathologist:           Frank Wilson MD                                                          Specimens:   1) - Lung, Left Lower Lobe, LLL Superior Segment/1.7 cm left lower lobe nodule ground               glass opacity                                                                                       2) - Lung, Left Lower Lobe, Left lower lobe                                                         3) - Lymph Node, L10                                                                                4) - Lymph Node, L11                                                                                5) - Lymph Node, Station 7                                                                  Clinical Information --     History of right middle lobe adenocarcinoma stage 1 2009  Call results to 80       Final Diagnosis --     1: LUNG, LEFT LOWER LOBE SUPERIOR SEGMENT, WEDGE RESECTION:   SMALL FOCUS OF ADENOCARCINOMA (LESS THAN 1 MM IN MAXIMUM DIMENSION) (SEE COMMENT).   EMPHYSEMATOUS CHANGE.   FOCAL INTRAALVEOLAR HEMORRHAGE.   FOCAL PERIBRONCHIAL CHRONIC INFLAMMATION, NONSPECIFIC.   SEE SYNOPTIC REPORT (BELOW) FOR ADDITIONAL DETAILS.    2: LUNG, LEFT LOWER LOBE, LOBECTOMY:   MINIMALLY  INVASIVE ADENOCARCINOMA, NONMUCINOUS.   TWELVE (12) PERIBRONCHIAL / HILAR LYMPH NODES, ALL NEGATIVE FOR METASTATIC           ADENOCARCINOMA.   SEE SYNOPTIC REPORT (BELOW) FOR ADDITIONAL DETAILS.    COMMENT: The small focus of adenocarcinoma present in specimen #1 was seen only on permanent sections and was not present in the tissue sampled for frozen section.  The relationship of this small lesion to the larger adenocarcinoma in the lobectomy specimen (specimen #2) is not entirely clear.  This likely represents multifocal disease.  The tumor in specimen #2 demonstrates a predominantly lepidic growth pattern with only very focal evidence of invasion, consistent with a minimally invasive adenocarcinoma.    3: LYMPH NODE, L10, EXCISION:   FRAGMENTS OF ANTHRACOTIC LYMPH NODE, ALL NEGATIVE FOR METASTATIC ADENOCARCINOMA.    4: LYMPH NODE, L11, EXCISION:   FRAGMENTS OF ANTHRACOTIC LYMPH NODE, ALL NEGATIVE FOR METASTATIC ADENOCARCINOMA.    5: LYMPH NODE, STATION 7, EXCISION:   ONE ANTHRACOTIC LYMPH NODE, NEGATIVE FOR METASTATIC ADENOCARCINOMA.      SYNOPTIC REPORT (Based on [facility] Cancer Case Summary, version January 2016):   Specimen: Lobe of lung (left lower lobe).   Procedure: Wedge resection and subsequent lobectomy.   Specimen Laterality: Left.   Tumor Site: Lower lobe.   Tumor Size: Indeterminate.  No grossly measurable mass was identified.  Tumor is present on multiple           slides in specimen #2 with the largest focus on a single slide measuring 9 mm.  Radiographically,           this lesion measured 1.7 cm.   Tumor Focality: Separate tumor nodules in same lobe.   Histologic Type: Minimally invasive adenocarcinoma, nonmucinous.   Histologic Grade: Grade 1-2 (well to moderately differentiated).   Visceral Pleural Invasion: Not identified.   Tumor Extension: Not applicable.   Margins:          Bronchial Margin: Uninvolved by invasive carcinoma and carcinoma in situ.         Vascular Margin: Uninvolved by invasive  "carcinoma.         Parenchymal Margin: Uninvolved by invasive carcinoma.         Distance of Invasive Carcinoma from Closest Margin: Indeterminate.   Lymph-Vascular Invasion: Not identified.   Pathologic Staging:         TNM descriptor: m (multiple primary tumors).         Primary Tumor: pT1a.         Regional Lymph Nodes: pN0.          Number of Lymph Nodes Examined: At least 15 (exact number indeterminate as some             lymph nodes submitted as multiple tissue fragments).          Number of Lymph Nodes Involved: 0.         Distant Metastasis: Not applicable.   Additional Pathologic Findings:          Emphysematous change.         Focal intraalveolar hemorrhage.   Ancillary Studies: PD-L1 (Keytruda) immunohistochemistry, EGFR mutation analysis, ALK1 FISH testing,           and ROS1 FISH testing will be performed with results to be reported in addenda.      EMILY/ant IHC/a/CMK,THM    CPT CODES:  1: 80689, 69354, 58559 (x2)  2: 54702, 12755, 60793,   3: 48002  4: 44584  5: 90373       Intraoperative Consultation --     #1FS DX:  LUNG, \"LLL SUPERIOR SEGMENT\" (3 BLOCKS): FOCAL STROMAL ABNORMALITY, POSSIBLY SMOOTH MUSCLE PROLIFERATION.  TDEHSAN/brb  IHC/CMK   Note:  This frozen section diagnosis was discussed with Dr. Morataya extensively.  No carcinoma is present in sections examined.  It is noted that the focal stromal abnormality is small and I cannot ensure that this was the radiologic lesion that was targeted.  He indicated that he was unable to feel the lesion intraoperatively and felt that a lobectomy was indicated.  Diagnosis called to Dr. Morataya at 10:06 a.m. on October 10, 2017.  EMILY/brb  IHC/CMK     #2FS DX: LUNG, LEFT LOWER LOBE (TWO BLOCKS): BRONCHIAL AND VASCULAR MARGINS FREE OF TUMOR. LUNG WITH ATYPICAL LESION, SUSPICIOUS FOR ADENOCARCINOMA, AT LEAST IN-SITU. EMILY/lucas/IHC/THM  This was called to Dr. Morataya at 10:44 a.m. On October 10, 2017. EMILY/lucas/IHC/THM       Intradepartmental Consult --     Megan MELO" "Lito and LUDWIG Motley have also reviewed this case and agree with the above diagnoses.       Gross Description --     1.  Received fresh with a request for frozen section evaluation, labeled \"LLL, superior segment/left lower lobe ground glass\" is a 9 x 7 x 1.2 wedge excision of lung with a staple line extending along the long aspect of the specimen.  The specimen has been incised prior to receipt in the laboratory.  This area is somewhat hemorrhagic but a definite mass lesion is not seen or palpated.  A representative section of the closest staple line margin is submitted for frozen section evaluation and re-submitted for permanence is FSC1A. Representative sections in the area that as incised are submitted for frozen section evaluation and resubmitted for permanence is FSC1B  And FSC1C.  Sectioning of the remainder of the lung reveals a pink tan spongy cut surface. No definite mass is seen or felt.  The remaining lung tissue around the area that was previously incised is submitted for permanent sectioning only in 1D-1I.  Additional random representative sections of lung tissue are submitted in 1J-1M.      TDJ/brb     2.  Part 2 is received fresh with request for frozen section evaluation labeled \"left lower lobe\" and consists of a 114 gram lung lobectomy specimen measuring 12.7 x 7.2 x 2.5 cm. There is a staple line along the hilar aspect of the specimen. The bronchial and vascular margins are identified at the hilum. These are shaved and submitted for frozen section evaluation and resubmitted for permanent as FSC2A. A representative section of lung tissue is submitted for frozen section evaluation and resubmitted for permanent as FSC2B. A definite well defined discrete mass lesion is not identified. Representative sections of lung tissue are submitted for permanent sectioning only in 2C-2M. All hilar lymph nodes are submitted in blocks 2N-2P.     TDJ/th      3: Received in formalin labeled \"lymph node L10\" are " "multiple pink tan to anthracotic fragments of tissue measuring 1.7 x 1.0 x 0.5 cm in aggregate.  The specimens are submitted all in a single block labeled 3A.    4: Received in formalin labeled \"lymph node L11\" are two pink tan to anthracotic fragments of tissue measuring 0.6 x 0.4 x 0.4 cm in aggregate.  The specimens are submitted all in block 4A.    5: Received in formalin labeled \"lymph node station 7\" is a 1.2 x 1.0 x 0.5 cm pink tan to anthracotic lymph node that is serially sectioned and submitted all in a single block labeled 5A.    CC/USO/EMILY/ant        Microscopic Description --     Performed, incorporated in diagnosis.        Embedded Images --    POC Glucose Fingerstick [[id number]]  (Abnormal) Collected:  10/11/17 1657    Specimen:  Blood Updated:  10/11/17 1700     Glucose 203 (H) mg/dL     Narrative:       Meter: [id number] : [id number] Kristi Osborne    POC Glucose Fingerstick [[id number]]  (Abnormal) Collected:  10/11/17 2001    Specimen:  Blood Updated:  10/11/17 2002     Glucose 178 (H) mg/dL     Narrative:       Meter: [id number] : [id number] Reid VALENZUELA    POC Glucose Fingerstick [[id number]]  (Abnormal) Collected:  10/12/17 0736    Specimen:  Blood Updated:  10/12/17 0738     Glucose 217 (H) mg/dL     Narrative:       Meter: [id number] : [id number] [place] FAY           Assessment/Plan     Principal Problem:    Lung nodule  Active Problems:    Lung nodule, solitary       Assessment:    Condition: In stable condition.  Improving.   (Stable post-op course.  HD stable.     Adenocarcinoma [id number]).     Plan:   Encourage ambulation.  Start/continue incentive spirometry.  X-rays as ordered.  (CXR stable.  No air leak from chest tube.  Only 30 ml of chest tube drainage in the last 3 hours.  Chest tube removed today.  Repeat CXR in am.  Continue current treatment plan.  Pulmonary hygiene, incentive spirometer, increase activity, and pain management.  Continue postop bowel " management orders.  Plans are for discharge tomorrow if remains stable.  ).       Ofelia Smith, APRN  [facility]  10/12/17  8:39 AM

## 2017-10-12 NOTE — SIGNIFICANT NOTE
10/12/17 1006   Rehab Treatment   Discipline physical therapist   Treatment Not Performed patient unavailable for treatment  (Pt reports ambulating in [place] independently.  Pt reports no concerns w/ steps and does not want to practice at this time.  Discussed activity recommendations and PT POC.  DC PT.  )   Recommendation   PT - Next Appointment 10/07/17

## 2017-10-12 NOTE — PROGRESS NOTES
Continued Stay Note  [facility]     Patient Name: Augustina Lobo  MRN: [id number]  Today's Date: 10/12/2017    Admit Date: 10/10/2017          Discharge Plan       10/12/17 0958    Case Management/Social Work Plan    Plan home    Patient/Family In Agreement With Plan yes    Additional Comments Spoke with pt bedside. Pt reports her Victoza is very expensive and is interested in assistance program. Pt reports she was on a program in past but didn't reapply and can't remember name. Pt reports she uses [facility] [phone] Elma reports pt out of pocket for medication is $328.93 for one month and she hasn't refilled since July 2016. Spoke with Aishwarya at the Victoza assistance program [phone] she reports pt may qualify for assistance if she meets income requirement and has spent $1000 on medications since Jan 2017 and will need to get print out from pharmacy, complete application and return. Application was faxed to SW and SW provided this application with instructions to pt.  SHAUN Jarrett              Discharge Codes     None            Telma Brewer

## 2017-10-12 NOTE — PLAN OF CARE
Problem: Patient Care Overview (Adult)  Goal: Plan of Care Review  Outcome: Ongoing (interventions implemented as appropriate)    10/12/17 1842   Coping/Psychosocial Response Interventions   Plan Of Care Reviewed With patient   Patient Care Overview   Progress improving   Outcome Evaluation   Outcome Summary/Follow up Plan vss, pt ct removed today, pt ambulates well, uses i/s well, pain well controlled, pepcid ordered today for indigestion/spitting mucous, pt sts pepcid has helped, possible discharge tomorrow, will continue to monitor         Problem: Perioperative Period (Adult)  Goal: Signs and Symptoms of Listed Potential Problems Will be Absent or Manageable (Perioperative Period)  Outcome: Ongoing (interventions implemented as appropriate)    10/12/17 1842   Perioperative Period   Problems Assessed (Perioperative Period) all   Problems Present (Perioperative Period) situational response;pain         Problem: Chest Tube Drainage Device (Adult)  Goal: Signs and Symptoms of Listed Potential Problems Will be Absent or Manageable (Chest Tube Drainage Device)  Outcome: Ongoing (interventions implemented as appropriate)    10/12/17 1842   Chest Tube Drainage Device   Problems Assessed (Chest Tube Drainage Device) all   Problems Present (Chest Tube Drainage Device) pain         Problem: Pain, Acute (Adult)  Goal: Identify Related Risk Factors and Signs and Symptoms  Outcome: Ongoing (interventions implemented as appropriate)    10/12/17 1842   Pain, Acute   Related Risk Factors (Acute Pain) surgery   Signs and Symptoms (Acute Pain) fatigue/weakness;verbalization of pain descriptors       Goal: Acceptable Pain Control/Comfort Level  Outcome: Ongoing (interventions implemented as appropriate)    10/12/17 1842   Pain, Acute (Adult)   Acceptable Pain Control/Comfort Level making progress toward outcome

## 2017-10-12 NOTE — PLAN OF CARE
Problem: Patient Care Overview (Adult)  Goal: Plan of Care Review  Outcome: Ongoing (interventions implemented as appropriate)    10/11/17 1959   Coping/Psychosocial Response Interventions   Plan Of Care Reviewed With patient   Patient Care Overview   Progress improving   Outcome Evaluation   Outcome Summary/Follow up Plan vss, d/c'd fluids today, pt ct on water seal, ct output 250ml, pt pain well controlled, pt ambulates well, encouraged use of i/s, will continue to monitor         Problem: Perioperative Period (Adult)  Goal: Signs and Symptoms of Listed Potential Problems Will be Absent or Manageable (Perioperative Period)  Outcome: Ongoing (interventions implemented as appropriate)    10/11/17 1959   Perioperative Period   Problems Assessed (Perioperative Period) all   Problems Present (Perioperative Period) pain         Problem: Chest Tube Drainage Device (Adult)  Goal: Signs and Symptoms of Listed Potential Problems Will be Absent or Manageable (Chest Tube Drainage Device)  Outcome: Ongoing (interventions implemented as appropriate)    10/11/17 1959   Chest Tube Drainage Device   Problems Assessed (Chest Tube Drainage Device) all   Problems Present (Chest Tube Drainage Device) pain         Problem: Pain, Acute (Adult)  Goal: Identify Related Risk Factors and Signs and Symptoms  Outcome: Ongoing (interventions implemented as appropriate)    10/11/17 1959   Pain, Acute   Related Risk Factors (Acute Pain) surgery   Signs and Symptoms (Acute Pain) fatigue/weakness       Goal: Acceptable Pain Control/Comfort Level  Outcome: Ongoing (interventions implemented as appropriate)    10/11/17 1959   Pain, Acute (Adult)   Acceptable Pain Control/Comfort Level making progress toward outcome

## 2017-10-12 NOTE — PLAN OF CARE
Problem: Patient Care Overview (Adult)  Goal: Plan of Care Review  Outcome: Ongoing (interventions implemented as appropriate)    Problem: Chest Tube Drainage Device (Adult)  Goal: Signs and Symptoms of Listed Potential Problems Will be Absent or Manageable (Chest Tube Drainage Device)  Outcome: Ongoing (interventions implemented as appropriate)

## 2017-10-13 ENCOUNTER — APPOINTMENT (OUTPATIENT)
Dept: GENERAL RADIOLOGY | Facility: HOSPITAL | Age: 72
End: 2017-10-13

## 2017-10-13 VITALS
BODY MASS INDEX: 26.68 KG/M2 | OXYGEN SATURATION: 96 % | HEIGHT: 62 IN | HEART RATE: 95 BPM | DIASTOLIC BLOOD PRESSURE: 67 MMHG | SYSTOLIC BLOOD PRESSURE: 144 MMHG | RESPIRATION RATE: 16 BRPM | WEIGHT: 145 LBS | TEMPERATURE: 98.6 F

## 2017-10-13 LAB — GLUCOSE BLDC GLUCOMTR-MCNC: 146 MG/DL (ref 70–130)

## 2017-10-13 PROCEDURE — 71010 HC CHEST PA OR AP: CPT

## 2017-10-13 PROCEDURE — 82962 GLUCOSE BLOOD TEST: CPT

## 2017-10-13 PROCEDURE — 25010000002 HEPARIN (PORCINE) PER 1000 UNITS: Performed by: THORACIC SURGERY (CARDIOTHORACIC VASCULAR SURGERY)

## 2017-10-13 RX ORDER — HYDROCODONE BITARTRATE AND ACETAMINOPHEN 7.5; 325 MG/1; MG/1
TABLET ORAL
Qty: 60 TABLET | Refills: 0 | Status: SHIPPED | OUTPATIENT
Start: 2017-10-13

## 2017-10-13 RX ADMIN — SENNOSIDES AND DOCUSATE SODIUM 2 TABLET: 8.6; 5 TABLET ORAL at 00:54

## 2017-10-13 RX ADMIN — HEPARIN SODIUM 5000 UNITS: 5000 INJECTION, SOLUTION INTRAVENOUS; SUBCUTANEOUS at 06:22

## 2017-10-13 RX ADMIN — CITALOPRAM 20 MG: 20 TABLET, FILM COATED ORAL at 08:14

## 2017-10-13 RX ADMIN — GLIPIZIDE 10 MG: 10 TABLET ORAL at 08:15

## 2017-10-13 RX ADMIN — PIOGLITAZONE 15 MG: 15 TABLET ORAL at 08:14

## 2017-10-13 RX ADMIN — METFORMIN HYDROCHLORIDE 1000 MG: 1000 TABLET ORAL at 08:15

## 2017-10-13 RX ADMIN — METOPROLOL TARTRATE 25 MG: 25 TABLET ORAL at 08:14

## 2017-10-13 RX ADMIN — FAMOTIDINE 20 MG: 20 TABLET, FILM COATED ORAL at 08:15

## 2017-10-13 RX ADMIN — CITALOPRAM 20 MG: 20 TABLET, FILM COATED ORAL at 06:22

## 2017-10-13 RX ADMIN — ATORVASTATIN CALCIUM 80 MG: 80 TABLET, FILM COATED ORAL at 00:55

## 2017-10-13 RX ADMIN — Medication 1 TABLET: at 10:29

## 2017-10-13 RX ADMIN — OXYCODONE HYDROCHLORIDE AND ACETAMINOPHEN 500 MG: 500 TABLET ORAL at 08:14

## 2017-10-13 RX ADMIN — LISINOPRIL AND HYDROCHLOROTHIAZIDE 1 TABLET: 10; 12.5 TABLET ORAL at 10:28

## 2017-10-13 RX ADMIN — HYDROCODONE BITARTRATE AND ACETAMINOPHEN 2 TABLET: 7.5; 325 TABLET ORAL at 06:22

## 2017-10-13 NOTE — PLAN OF CARE
Problem: Pain, Acute (Adult)  Goal: Identify Related Risk Factors and Signs and Symptoms    10/13/17 0404   Pain, Acute   Related Risk Factors (Acute Pain) surgery   Signs and Symptoms (Acute Pain) verbalization of pain descriptors

## 2017-10-13 NOTE — PLAN OF CARE
Problem: Perioperative Period (Adult)  Goal: Signs and Symptoms of Listed Potential Problems Will be Absent or Manageable (Perioperative Period)    10/13/17 0404   Perioperative Period   Problems Assessed (Perioperative Period) all   Problems Present (Perioperative Period) situational response

## 2017-10-13 NOTE — PLAN OF CARE
Problem: Patient Care Overview (Adult)  Goal: Plan of Care Review  Outcome: Ongoing (interventions implemented as appropriate)    10/13/17 0404   Coping/Psychosocial Response Interventions   Plan Of Care Reviewed With patient   Patient Care Overview   Progress improving   Outcome Evaluation   Outcome Summary/Follow up Plan dressing dry and intact to chest tube removal sight no complaints of pain or other discomfort states ready for home          Problem: Perioperative Period (Adult)  Goal: Signs and Symptoms of Listed Potential Problems Will be Absent or Manageable (Perioperative Period)  Outcome: Ongoing (interventions implemented as appropriate)    Problem: Chest Tube Drainage Device (Adult)  Goal: Signs and Symptoms of Listed Potential Problems Will be Absent or Manageable (Chest Tube Drainage Device)  Outcome: Outcome(s) achieved Date Met:  10/13/17    Problem: Pain, Acute (Adult)  Goal: Identify Related Risk Factors and Signs and Symptoms  Outcome: Ongoing (interventions implemented as appropriate)

## 2017-10-13 NOTE — PROGRESS NOTES
Continued Stay Note  [facility]     Patient Name: Augustina Lobo  MRN: [id number]  Today's Date: 10/13/2017    Admit Date: 10/10/2017          Discharge Plan       10/13/17 1109    Case Management/Social Work Plan    Plan Home    Patient/Family In Agreement With Plan yes    Additional Comments Met with pt and family at bedside.  Discharge orders noted.  Pt denies needs.  Ambulating in room independently.  Order reviewed.  LORIE Flores RN              Discharge Codes     None        Expected Discharge Date and Time     Expected Discharge Date Expected Discharge Time    Oct 13, 2017             Pati Flores

## 2017-10-13 NOTE — DISCHARGE SUMMARY
Patient Care Team:  Jose G Nunez MD as PCP - General (Internal Medicine)    Date of Admission: 10/10/2017   Date of Discharge:  10/13/2017    Discharge Diagnosis: Adenocarcinoma [id number]    Presenting Problem  Lung nodule [R91.1]  Lung nodule, solitary [R91.1]     History of Present Illness  Augustina Lobo is a 72 y.o. female who presented with a prior history of right middle lobe adenocarcinoma resected in 2009.  She presents with an enlarging left lower lobe groundglass opacity initially measuring 1.2 cm, then 1.5 cm and currently 1.7 cm on her most recent CT scan of the chest.  In light of this progressive changes in this groundglass opacity or concerns for malignancy or high.  We decided proceed with surgical resection.    Hospital Course  Augustina Lobo underwent surgery on date of admission which was uneventful.  She was admitted 5 E. for close observation, pain management and management of chest tube.  Her postoperative recovery was uneventful.  This operative day 1 her pain was well controlled.  She had no shortness of breath.  She was ambulating without any problems.  Her chest tube was placed to waterseal due to chest x-ray being stable.  No airleak.  DC'd her Pulido catheter and IV fluids.  Postop a.m. labs were reviewed and stable.  Continue pain management and pulmonary hygiene including incentive spirometer.  A postoperative day 2, her chest x-ray remained stable.  She had no airleak.  She had decreased drainage from chest tube.  Her chest tube was removed at this time without difficulty and DuoDERM dressing was applied.  Final pathology revealed consults were received, showing adenocarcinoma of the left lower lobe [id number] M0.  No further treatment recommended at this time.  Today, on postoperative day 3, her chest x-ray remained stable after chest tube removal.  She is ambulating without any problems.  No problems with bowel or bladder.  Her pain is controlled and she has no shortness of  breath.  She is hemodynamically stable and ready for discharge home today.    Procedures Performed  Procedure(s):  LEFT THORACOSCOPY VIDEO ASSISTED.EXPLORATORY LEFT THORACOSCOPY WITH SUPERIOR SEGMENTECTOMY, COMPLETION LEFT LOWER LOBECTOMY EXPLORATORY BRONCHOSCOPY. SUBPLEURAL PAIN CATHETER INSERTION X2       Consults:   Consults     No orders found from 9/11/2017 to 10/11/2017.          Pertinent Test Results:     Imaging Results (last 24 hours)     Procedure Component Value Units Date/Time    XR Chest 1 View [[id number]] Collected:  10/12/17 0736     Updated:  10/12/17 1423    Narrative:       PORTABLE AP SEMIERECT CHEST DATED 10/12/2017 AT 0510 HOURS     CLINICAL HISTORY: 72-year-old female 2 days postop left video-assisted  thoracoscopy with left lower lobectomy for adenocarcinoma.     FINDINGS: When compared to the most recent prior chest radiograph, the  postoperative portable AP semierect projection of 10/11/2017 at 0516  hours, there is increased patchy atelectasis or infiltrate in the medial  basilar left lung with persistent elevation of left hemidiaphragm  compatible with partial resection of left lung. The costophrenic angles  are sharp. Trace subcutaneous air in the left chest wall is present.  Left chest tube remains with its tip at the medial left upper chest. No  pneumothorax is seen. A 1-2 mm trace of left apical pneumothorax is not  excluded, but there are multiple overlapping silhouettes of bony  framework at the left apex. Crowded markings are present at the right  lower lung zone. The heart is within normal limits of caliber. Aortic  calcification is again demonstrated. No acute change in bony thorax is  seen. There is developing gaseous distention or dilatation of the  stomach. Monitoring lead wires are present.     CONCLUSION: Minimal if any left apical pneumothorax is seen with left  chest tube remaining. Increased hypoaeration changes in the lung bases,  particularly the left.     This report  was finalized on 10/12/2017 2:20 PM by Dr. Chava Craig MD.       XR Chest 1 View [[id number]] Collected:  10/11/17 0545     Updated:  10/13/17 0244    Narrative:       X-RAY CHEST 1 VIEW.     HISTORY: Chest tube management.     COMPARISON: 10/10/2017.     FINDINGS:  Cardiomediastinal silhouette is within normal limits.         There is no consolidation or effusion. Left chest tube is stable.          Impression:       No acute findings. No significant change.     This report was finalized on 10/13/2017 2:41 AM by Dr. Geronimo Green MD.       XR Chest 1 View [[id number]] Collected:  10/13/17 0753     Updated:  10/13/17 0935    Narrative:       CLINICAL HISTORY: 72-year-old female 3 days postop left video-assisted  thoracoscopy with left lower lobectomy for adenocarcinoma.     PORTABLE AP SEMIERECT CHEST DATED 10/13/2017 AT 0520 HOURS     FINDINGS: When compared to the exam dated 10/12/2017 at 0510 hours, a  portable AP semierect projection as well, there has been interval  removal of the left chest tube. I cannot exclude a 2-4 mm superolateral  left apical pleural separation but do not identify any large  pneumothorax. Patchy atelectasis or infiltrate in the left lower lung  zone is similar to increased. Elevation of the left hemidiaphragm  remains despite clearing of previously demonstrated gaseous distention  of the stomach. Cardiac silhouette is within normal limits of caliber.  Aortic calcification is again demonstrated. Monitoring lead wires are  present. No acute change in the bony thorax is seen.     CONCLUSION: Slightly increased airspace opacity in the left lower lung  zone. Minimal if any left apical pneumothorax following removal of left  chest tube.     This report was finalized on 10/13/2017 9:32 AM by Dr. Chava Craig MD.             Lab Results (last 24 hours)     Procedure Component Value Units Date/Time    POC Glucose Fingerstick [[id number]]  (Abnormal) Collected:  10/12/17 1133    Specimen:  Blood  Updated:  10/12/17 1134     Glucose 166 (H) mg/dL     Narrative:       Meter: [id number] : [id number] Lissett Groves RN    POC Glucose Fingerstick [[id number]]  (Abnormal) Collected:  10/12/17 1645    Specimen:  Blood Updated:  10/12/17 1647     Glucose 163 (H) mg/dL     Narrative:       Meter: [id number] : [id number] Darien FAY    POC Glucose Fingerstick [[id number]]  (Abnormal) Collected:  10/12/17 2115    Specimen:  Blood Updated:  10/12/17 2118     Glucose 195 (H) mg/dL     Narrative:       Meter: [id number] : [id number] Reid VALENZUELA    POC Glucose Fingerstick [[id number]]  (Abnormal) Collected:  10/13/17 0730    Specimen:  Blood Updated:  10/13/17 0732     Glucose 146 (H) mg/dL     Narrative:       Meter: [id number] : [id number] Darien FAY        Tissue Pathology Exam - Tissue, Lung, Left Lower Lobe   Order: [id number]   Status:  Final result   Visible to patient:  No (Not Released) Dx:  Lung nodule      3d ago     Clinical Information       History of right middle lobe adenocarcinoma stage 1 2009  Call results to [id number]   Final Diagnosis   1: LUNG, LEFT LOWER LOBE SUPERIOR SEGMENT, WEDGE RESECTION:                         SMALL FOCUS OF ADENOCARCINOMA (LESS THAN 1 MM IN MAXIMUM DIMENSION) (SEE COMMENT).                         EMPHYSEMATOUS CHANGE.                         FOCAL INTRAALVEOLAR HEMORRHAGE.                         FOCAL PERIBRONCHIAL CHRONIC INFLAMMATION, NONSPECIFIC.                         SEE SYNOPTIC REPORT (BELOW) FOR ADDITIONAL DETAILS.     2: LUNG, LEFT LOWER LOBE, LOBECTOMY:                         MINIMALLY INVASIVE ADENOCARCINOMA, NONMUCINOUS.                         TWELVE (12) PERIBRONCHIAL / HILAR LYMPH NODES, ALL NEGATIVE FOR METASTATIC                                 ADENOCARCINOMA.                         SEE SYNOPTIC REPORT (BELOW) FOR ADDITIONAL DETAILS.     COMMENT: The small focus of adenocarcinoma present in specimen #1 was seen only on permanent  sections and was not present in the tissue sampled for frozen section.  The relationship of this small lesion to the larger adenocarcinoma in the lobectomy specimen (specimen #2) is not entirely clear.  This likely represents multifocal disease.  The tumor in specimen #2 demonstrates a predominantly lepidic growth pattern with only very focal evidence of invasion, consistent with a minimally invasive adenocarcinoma.     3: LYMPH NODE, L10, EXCISION:                         FRAGMENTS OF ANTHRACOTIC LYMPH NODE, ALL NEGATIVE FOR METASTATIC ADENOCARCINOMA.     4: LYMPH NODE, L11, EXCISION:                         FRAGMENTS OF ANTHRACOTIC LYMPH NODE, ALL NEGATIVE FOR METASTATIC ADENOCARCINOMA.     5: LYMPH NODE, STATION 7, EXCISION:                         ONE ANTHRACOTIC LYMPH NODE, NEGATIVE FOR METASTATIC ADENOCARCINOMA.        SYNOPTIC REPORT (Based on [facility] Cancer Case Summary, version January 2016):                         Specimen: Lobe of lung (left lower lobe).                         Procedure: Wedge resection and subsequent lobectomy.                         Specimen Laterality: Left.                         Tumor Site: Lower lobe.                         Tumor Size: Indeterminate.  No grossly measurable mass was identified.  Tumor is present on multiple                                 slides in specimen #2 with the largest focus on a single slide measuring 9 mm.  Radiographically,                                 this lesion measured 1.7 cm.                         Tumor Focality: Separate tumor nodules in same lobe.                         Histologic Type: Minimally invasive adenocarcinoma, nonmucinous.                         Histologic Grade: Grade 1-2 (well to moderately differentiated).                         Visceral Pleural Invasion: Not identified.                         Tumor Extension: Not applicable.                         Margins:                                                 Bronchial Margin:  Uninvolved by invasive carcinoma and carcinoma in situ.                                                Vascular Margin: Uninvolved by invasive carcinoma.                                                Parenchymal Margin: Uninvolved by invasive carcinoma.                                                Distance of Invasive Carcinoma from Closest Margin: Indeterminate.                         Lymph-Vascular Invasion: Not identified.                         Pathologic Staging:                                                TNM descriptor: m (multiple primary tumors).                                                Primary Tumor: pT1a.                                                Regional Lymph Nodes: pN0.                                                                       Number of Lymph Nodes Examined: At least 15 (exact number indeterminate as some                                                                               lymph nodes submitted as multiple tissue fragments).                                                                       Number of Lymph Nodes Involved: 0.                                                Distant Metastasis: Not applicable.                         Additional Pathologic Findings:                                                 Emphysematous change.                                                Focal intraalveolar hemorrhage.                         Ancillary Studies: PD-L1 (Keytruda) immunohistochemistry, EGFR mutation analysis, ALK1 FISH testing,                                 and ROS1 FISH testing will be performed with results to be reported in addenda.        EMILY/ant IHC/a/SHAD,ALVIN     CPT CODES:  1: 82070, 55808, 48030 (x2)  2: 53646, 06056, 64454,   3: 67510  4: 70050  5: 63946               Condition on Discharge:  Stable    Vital Signs  Temp:  [98.3 °F (36.8 °C)-99.4 °F (37.4 °C)] 98.6 °F (37 °C)  Heart Rate:  [] 95  Resp:  [16-18] 16  BP: (110-144)/(51-68)  144/67    Physical Exam:   General Appearance:   Alert, cooperative, in no acute distress    Lungs:   Decreased BS in the bases bilaterally,respirations regular, even and unlabored    Heart:   Regular rhythm and normal rate, no murmur   Chest Wall:   No abnormalities observed    Abdomen:  Extremities:  Normal bowel sounds, soft, non-tender, non-distended, +BM yesterday    Moves all extremities well, no edema, no cyanosis       Discharge Disposition  Home today    Discharge Medications   Augustina Lobo   Home Medication Instructions MILKA:[id number]    Printed on:10/13/17 6225   Medication Information                      aspirin 81 MG EC tablet  Take 81 mg by mouth Daily. PT HOLDING FOR SURGERY             atorvastatin (LIPITOR) 80 MG tablet  Take 80 mg by mouth Every Night.             calcium citrate-vitamin d (CITRACAL) 200-250 MG-UNIT tablet tablet  Take 1 tablet by mouth Daily.             citalopram (CeleXA) 20 MG tablet  Take 20 mg by mouth Every Morning.             CRANBERRY PO  Take 1 tablet/day by mouth Daily.             glimepiride (AMARYL) 4 MG tablet  Take 4 mg by mouth Every Morning Before Breakfast.             HYDROcodone-acetaminophen (NORCO) 7.5-325 MG per tablet  Take 1-2 tablets every 6 hours prn pain             lisinopril-hydrochlorothiazide (PRINZIDE,ZESTORETIC) 10-12.5 MG per tablet  Take 1 tablet by mouth Every Morning.             metFORMIN (GLUCOPHAGE) 1000 MG tablet  Take 1,000 mg by mouth 2 (Two) Times a Day.             Multiple Vitamins-Minerals (MULTIVITAMIN ADULT PO)  Take 1 tablet/day by mouth Daily.             pioglitazone (ACTOS) 15 MG tablet  Take 15 mg by mouth Every Morning.             VICTOZA 18 MG/3ML solution pen-injector  Take 1.8 mg by mouth Every Morning.             vitamin B-12 (CYANOCOBALAMIN) 100 MCG tablet  Take 50 mcg by mouth Daily.             vitamin C (ASCORBIC ACID) 500 MG tablet  Take 500 mg by mouth Daily.                 Discharge Instructions:  · No  heavy lifting, pushing, pulling greater than 10 pounds.  · No driving up until 2 weeks after surgery and no longer taking narcotics.  · Resume home diet as tolerated.  · Continue incentive spirometer at least 4 times per day.  · May shower on day of discharge. Remove dressing from post chest tube site after 48 hours, then clean with antibacterial soap, and apply gauze dressing or band-aid as needed for any drainage.  No dressing needed once no longer draining.          Follow-up Appointments  Future Appointments  Date Time Provider Department Center   10/30/2017 2:00 PM Wayne Morataya MD [facility] None           For any questions regarding patient's stay, please refer to patient's chart.    Ofelia Smith, APRN  [facility]  10/13/17  11:07 AM

## 2017-10-23 LAB
CYTO UR: NORMAL
LAB AP CASE REPORT: NORMAL
LAB AP CLINICAL INFORMATION: NORMAL
LAB AP INTRADEPARTMENTAL CONSULT: NORMAL
Lab: NORMAL
PATH REPORT.ADDENDUM SPEC: NORMAL
PATH REPORT.FINAL DX SPEC: NORMAL
PATH REPORT.GROSS SPEC: NORMAL

## 2017-10-30 ENCOUNTER — OFFICE VISIT (OUTPATIENT)
Dept: SURGERY | Facility: CLINIC | Age: 72
End: 2017-10-30

## 2017-10-30 ENCOUNTER — HOSPITAL ENCOUNTER (OUTPATIENT)
Dept: GENERAL RADIOLOGY | Facility: HOSPITAL | Age: 72
Discharge: HOME | End: 2017-10-30
Attending: THORACIC SURGERY (CARDIOTHORACIC VASCULAR SURGERY) | Admitting: THORACIC SURGERY (CARDIOTHORACIC VASCULAR SURGERY)

## 2017-10-30 VITALS
HEART RATE: 98 BPM | HEIGHT: 62 IN | SYSTOLIC BLOOD PRESSURE: 134 MMHG | WEIGHT: 140.6 LBS | BODY MASS INDEX: 25.88 KG/M2 | DIASTOLIC BLOOD PRESSURE: 72 MMHG | OXYGEN SATURATION: 97 %

## 2017-10-30 DIAGNOSIS — C34.32 MALIGNANT NEOPLASM OF LOWER LOBE, LEFT BRONCHUS OR LUNG (CODE): ICD-10-CM

## 2017-10-30 DIAGNOSIS — Z98.890 POST-OPERATIVE STATE: Primary | ICD-10-CM

## 2017-10-30 DIAGNOSIS — C34.32 MALIGNANT NEOPLASM OF LOWER LOBE OF LEFT LUNG (HCC): Primary | ICD-10-CM

## 2017-10-30 DIAGNOSIS — Z09 SURGERY FOLLOW-UP: ICD-10-CM

## 2017-10-30 DIAGNOSIS — C34.32 MALIGNANT NEOPLASM OF LOWER LOBE OF LEFT LUNG (HCC): ICD-10-CM

## 2017-10-30 PROCEDURE — 71020 HC CHEST PA AND LATERAL: CPT

## 2017-10-30 PROCEDURE — 99024 POSTOP FOLLOW-UP VISIT: CPT | Performed by: THORACIC SURGERY (CARDIOTHORACIC VASCULAR SURGERY)

## 2017-10-30 NOTE — PROGRESS NOTES
Subjective   Patient ID: Augustina Lobo is a 72 y.o. female is here today for follow-up.    History of Present Illness  Dear Colleagues,   Augustina Lobo is here today for their first postoperative visit after her left video-assisted thoracoscopy and left lower lobectomy for a stage IA minimally invasive adenocarcinoma, nonmucinous of the lung.  She denies any complaints of fever, chills, cough, hemoptysis, pleuritic chest pain, shortness of air, dyspnea with exertion, night sweats, hoarseness, or unintentional weight loss.  She has no other somatic complaints.    The following portions of the patient's history were reviewed and updated as appropriate: allergies, current medications, past family history, past medical history, past social history, past surgical history and problem list.  Review of Systems   Constitution: Positive for malaise/fatigue.   HENT: Negative.    Eyes: Negative.    Cardiovascular: Positive for chest pain.        Left sided pain in surgery site   Respiratory: Positive for shortness of breath.    Endocrine: Negative.    Hematologic/Lymphatic: Negative.    Skin: Negative.    Musculoskeletal: Positive for back pain.        Pt states she has a burning sensation in her back   Gastrointestinal: Negative.    Genitourinary: Negative.    Neurological: Negative.    Psychiatric/Behavioral: Negative.         Objective   Physical Exam   Constitutional: She is oriented to person, place, and time. Vital signs are normal. She appears well-developed.   HENT:   Head: Normocephalic and atraumatic.   Neck: Normal range of motion. Neck supple.   Cardiovascular: Normal rate, regular rhythm, normal heart sounds and intact distal pulses.    No murmur heard.  Pulmonary/Chest: Effort normal and breath sounds normal. She has no wheezes. She has no rhonchi. She has no rales. She exhibits no tenderness.   Abdominal: Soft. There is no tenderness.   Musculoskeletal: Normal range of motion. She exhibits no edema or tenderness.    Neurological: She is alert and oriented to person, place, and time. She has normal strength.   Skin: Skin is warm and dry. No rash noted. No cyanosis or erythema.   Psychiatric: She has a normal mood and affect. Her behavior is normal.       Assessment/Plan   Independent Review of Radiographic Studies:    Tissue Pathology Exam - Tissue, Lung, Left Lower Lobe   Order: [id number]   Status:  Edited Result - FINAL   Visible to patient:  Yes (MyChart) Dx:  Lung nodule      2wk ago     Addendum 4   Please see the completely scanned ROS 1 and ALK Fluorescence In-Situ Hybridization report from [facility] below.   Addendum electronically signed by Frank Wilson MD on 10/23/2017 at 0711   Addendum 3   Please see the completely scanned EGFR Snapshot report from [facility].    Addendum electronically signed by Frank Wilson MD on 10/20/2017 at 1110   Addendum 2   Please see the completely scanned Test Cancellation for ALK (FISH) and ROS1 (FISH) report from [facility].    Addendum electronically signed by Frank Wilson MD on 10/15/2017 at 1503   Addendum   Please see the completely scanned PD-L1 Immunohistochemistry Analysis report from [facility].    Addendum electronically signed by Frank Wilson MD on 10/14/2017 at 0951   Clinical Information       History of right middle lobe adenocarcinoma stage 1 2009  Call results to [id number]   Final Diagnosis   1: LUNG, LEFT LOWER LOBE SUPERIOR SEGMENT, WEDGE RESECTION:                         SMALL FOCUS OF ADENOCARCINOMA (LESS THAN 1 MM IN MAXIMUM DIMENSION) (SEE COMMENT).                         EMPHYSEMATOUS CHANGE.                         FOCAL INTRAALVEOLAR HEMORRHAGE.                         FOCAL PERIBRONCHIAL CHRONIC INFLAMMATION, NONSPECIFIC.                         SEE SYNOPTIC REPORT (BELOW) FOR ADDITIONAL DETAILS.     2: LUNG, LEFT LOWER LOBE, LOBECTOMY:                         MINIMALLY INVASIVE ADENOCARCINOMA, NONMUCINOUS.                          TWELVE (12) PERIBRONCHIAL / HILAR LYMPH NODES, ALL NEGATIVE FOR METASTATIC                                 ADENOCARCINOMA.                         SEE SYNOPTIC REPORT (BELOW) FOR ADDITIONAL DETAILS.     COMMENT: The small focus of adenocarcinoma present in specimen #1 was seen only on permanent sections and was not present in the tissue sampled for frozen section.  The relationship of this small lesion to the larger adenocarcinoma in the lobectomy specimen (specimen #2) is not entirely clear.  This likely represents multifocal disease.  The tumor in specimen #2 demonstrates a predominantly lepidic growth pattern with only very focal evidence of invasion, consistent with a minimally invasive adenocarcinoma.     3: LYMPH NODE, L10, EXCISION:                         FRAGMENTS OF ANTHRACOTIC LYMPH NODE, ALL NEGATIVE FOR METASTATIC ADENOCARCINOMA.     4: LYMPH NODE, L11, EXCISION:                         FRAGMENTS OF ANTHRACOTIC LYMPH NODE, ALL NEGATIVE FOR METASTATIC ADENOCARCINOMA.     5: LYMPH NODE, STATION 7, EXCISION:                         ONE ANTHRACOTIC LYMPH NODE, NEGATIVE FOR METASTATIC ADENOCARCINOMA.        SYNOPTIC REPORT (Based on [facility] Cancer Case Summary, version January 2016):                         Specimen: Lobe of lung (left lower lobe).                         Procedure: Wedge resection and subsequent lobectomy.                         Specimen Laterality: Left.                         Tumor Site: Lower lobe.                         Tumor Size: Indeterminate.  No grossly measurable mass was identified.  Tumor is present on multiple                                 slides in specimen #2 with the largest focus on a single slide measuring 9 mm.  Radiographically,                                 this lesion measured 1.7 cm.                         Tumor Focality: Separate tumor nodules in same lobe.                         Histologic Type: Minimally invasive adenocarcinoma,  nonmucinous.                         Histologic Grade: Grade 1-2 (well to moderately differentiated).                         Visceral Pleural Invasion: Not identified.                         Tumor Extension: Not applicable.                         Margins:                                                 Bronchial Margin: Uninvolved by invasive carcinoma and carcinoma in situ.                                                Vascular Margin: Uninvolved by invasive carcinoma.                                                Parenchymal Margin: Uninvolved by invasive carcinoma.                                                Distance of Invasive Carcinoma from Closest Margin: Indeterminate.                         Lymph-Vascular Invasion: Not identified.                         Pathologic Staging:                                                TNM descriptor: m (multiple primary tumors).                                                Primary Tumor: pT1a.                                                Regional Lymph Nodes: pN0.                                                                       Number of Lymph Nodes Examined: At least 15 (exact number indeterminate as some                                                                               lymph nodes submitted as multiple tissue fragments).                                                                       Number of Lymph Nodes Involved: 0.                                                Distant Metastasis: Not applicable.                         Additional Pathologic Findings:                                                 Emphysematous change.                                                Focal intraalveolar hemorrhage.                         Ancillary Studies: PD-L1 (Keytruda) immunohistochemistry, EGFR mutation analysis, ALK1 FISH testing,                                 and ROS1 FISH testing will be performed with results to be reported in  addenda.              TWO-VIEW CHEST      HISTORY: Previous left lobectomy for lung cancer. Followup evaluation.      The lungs are moderately expanded with some localized atelectasis and  pleural reaction at the left base unchanged from 10/13/2017. There is  slight volume loss related to the previous surgery. The right lung  remains clear and the heart size is normal.      This report was finalized on 10/30/2017 3:07 PM by Dr. Everton Cruz MD.      Assessment:  Stable postoperative course.  No evidence of recurrent or residual disease.  Plan:  Plan for continued follow up and surveillance with repeat chest x-ray in 6 weeks.  Currently I do not believe that she requires any adjuvant therapy as this appears to represent a stage IA adenocarcinoma of the lung however I am confused by the terminology listed in the report as a small focus of adenocarcinoma present in specimen 1 was seen only on permanent sections and was not present in the tissue sampled for frozen section.  The relationship of this small lesion to the larger adenocarcinoma in the lobectomy specimen is not clear that point out that this may represent multifocal disease and that the tumor in specimen 2, left lower lobe demonstrates predominantly lip headache growth pattern with very focal evidence of invasion consistent with minimally invasive adenocarcinoma.  If these are 2 separate tumors this would represent a T3, N0 M0 or stage IIb.  I will discuss this in further detail with the pathologist and we will adjust our staging appropriately.  I will also present this in our multidiscipline conference.  Is it possible that this may represent a metastatic lesion although unlikely.  I will also order a PET scan for her next follow-up visit. Should you have any questions or concerns regarding your patient's care, please do not hesitate to contact me.  Sincerely, Wayne Morataya M.D.  There are no diagnoses linked to this encounter.

## 2017-11-08 ENCOUNTER — HOSPITAL ENCOUNTER (OUTPATIENT)
Dept: PET IMAGING | Facility: HOSPITAL | Age: 72
Discharge: HOME | End: 2017-11-08
Attending: THORACIC SURGERY (CARDIOTHORACIC VASCULAR SURGERY) | Admitting: THORACIC SURGERY (CARDIOTHORACIC VASCULAR SURGERY)

## 2017-11-08 ENCOUNTER — HOSPITAL ENCOUNTER (OUTPATIENT)
Dept: PET IMAGING | Facility: HOSPITAL | Age: 72
Discharge: HOME | End: 2017-11-08
Attending: THORACIC SURGERY (CARDIOTHORACIC VASCULAR SURGERY)

## 2017-11-08 DIAGNOSIS — C34.32 MALIGNANT NEOPLASM OF LOWER LOBE, LEFT BRONCHUS OR LUNG (CODE): ICD-10-CM

## 2017-11-08 DIAGNOSIS — Z98.890 POST-OPERATIVE STATE: ICD-10-CM

## 2017-11-08 LAB — GLUCOSE BLDC GLUCOMTR-MCNC: 129 MG/DL (ref 70–130)

## 2017-11-08 PROCEDURE — 0 FLUDEOXYGLUCOSE F18 SOLUTION: Performed by: THORACIC SURGERY (CARDIOTHORACIC VASCULAR SURGERY)

## 2017-11-08 PROCEDURE — A9552 F18 FDG: HCPCS | Performed by: THORACIC SURGERY (CARDIOTHORACIC VASCULAR SURGERY)

## 2017-11-08 PROCEDURE — 82962 GLUCOSE BLOOD TEST: CPT

## 2017-11-08 PROCEDURE — 78815 PET IMAGE W/CT SKULL-THIGH: CPT

## 2017-11-08 RX ADMIN — FLUDEOXYGLUCOSE F18 1 DOSE: 300 INJECTION INTRAVENOUS at 08:58

## 2017-11-13 ENCOUNTER — OFFICE VISIT (OUTPATIENT)
Dept: SURGERY | Facility: CLINIC | Age: 72
End: 2017-11-13

## 2017-11-13 VITALS
HEIGHT: 62 IN | BODY MASS INDEX: 25.4 KG/M2 | OXYGEN SATURATION: 98 % | WEIGHT: 138 LBS | HEART RATE: 100 BPM | SYSTOLIC BLOOD PRESSURE: 132 MMHG | DIASTOLIC BLOOD PRESSURE: 70 MMHG

## 2017-11-13 DIAGNOSIS — Z98.890 POST-OPERATIVE STATE: Primary | ICD-10-CM

## 2017-11-13 PROCEDURE — 99024 POSTOP FOLLOW-UP VISIT: CPT | Performed by: THORACIC SURGERY (CARDIOTHORACIC VASCULAR SURGERY)

## 2017-11-13 RX ORDER — RANITIDINE HCL 75 MG
75 TABLET ORAL 2 TIMES DAILY
COMMUNITY

## 2017-11-13 NOTE — PROGRESS NOTES
Subjective   Patient ID: Augustina Lobo is a 72 y.o. female for follow-up after her left VATS and left lower lobectomy for stage Ia adenocarcinoma of the lung.    History of Present Illness  Dear Colleague,  Augustina Lobo was seen in our office today.  She denies any complaints of fever, chills, cough, hemoptysis, pleuritic chest pain, shortness of air, dyspnea with exertion, night sweats, hoarseness, or unintentional weight loss.  She has no other somatic complaints or alleviating or exacerbating factors aside from those mentioned above.    The following portions of the patient's history were reviewed and updated as appropriate: allergies, current medications, past family history, past medical history, past social history, past surgical history and problem list.  Review of Systems   Constitution: Negative.   HENT: Negative.    Eyes: Negative.    Cardiovascular: Negative.    Respiratory: Negative.    Endocrine: Negative.    Hematologic/Lymphatic: Negative.    Skin: Negative.    Musculoskeletal: Negative.    Gastrointestinal: Positive for flatus.   Genitourinary: Negative.    Neurological: Negative.    Psychiatric/Behavioral: Negative.      Patient Active Problem List   Diagnosis   • History of lung cancer   • Ground glass opacity present on imaging of lung   • Lung nodule   • Lung nodule, solitary   • Post-operative state     Past Medical History:   Diagnosis Date   • Arthritis    • Diabetes mellitus    • Elevated cholesterol    • History of diverticulitis    • Hypertension    • Lung cancer 06/09/2009    Resected Right Lobectomy per Dr. Morataya     Past Surgical History:   Procedure Laterality Date   • APPENDECTOMY     • BUNIONECTOMY     • DILATATION AND CURETTAGE      Times 2   • EYE SURGERY     • HYSTERECTOMY     • LUNG LOBECTOMY Right    • THORACOSCOPY Left 10/10/2017    Procedure: LEFT THORACOSCOPY VIDEO ASSISTED.EXPLORATORY LEFT THORACOSCOPY WITH SUPERIOR SEGMENTECTOMY, COMPLETION LEFT LOWER LOBECTOMY EXPLORATORY  BRONCHOSCOPY. SUBPLEURAL PAIN CATHETER INSERTION X2;  Surgeon: Wayne Morataya MD;  Location: [facility] MAIN OR;  Service:    • TONSILLECTOMY       Family History   Problem Relation Age of Onset   • Malig Hyperthermia Neg Hx      Social History     Social History   • Marital status:      Spouse name: N/A   • Number of children: N/A   • Years of education: N/A     Occupational History   • Not on file.     Social History Main Topics   • Smoking status: Former Smoker     Packs/day: 0.75     Years: 18.00     Types: Cigarettes     Quit date: 6/14/1969   • Smokeless tobacco: Never Used   • Alcohol use No   • Drug use: No   • Sexual activity: Defer     Other Topics Concern   • Not on file     Social History Narrative       Current Outpatient Prescriptions:   •  aspirin 81 MG EC tablet, Take 81 mg by mouth Daily. PT HOLDING FOR SURGERY, Disp: , Rfl:   •  atorvastatin (LIPITOR) 80 MG tablet, Take 80 mg by mouth Every Night., Disp: , Rfl:   •  calcium citrate-vitamin d (CITRACAL) 200-250 MG-UNIT tablet tablet, Take 1 tablet by mouth Daily., Disp: , Rfl:   •  citalopram (CeleXA) 20 MG tablet, Take 20 mg by mouth Every Morning., Disp: , Rfl:   •  CRANBERRY PO, Take 1 tablet/day by mouth Daily., Disp: , Rfl:   •  glimepiride (AMARYL) 4 MG tablet, Take 4 mg by mouth Every Morning Before Breakfast., Disp: , Rfl:   •  HYDROcodone-acetaminophen (NORCO) 7.5-325 MG per tablet, Take 1-2 tablets every 6 hours prn pain, Disp: 60 tablet, Rfl: 0  •  lisinopril-hydrochlorothiazide (PRINZIDE,ZESTORETIC) 10-12.5 MG per tablet, Take 1 tablet by mouth Every Morning., Disp: , Rfl:   •  metFORMIN (GLUCOPHAGE) 1000 MG tablet, Take 1,000 mg by mouth 2 (Two) Times a Day., Disp: , Rfl:   •  Multiple Vitamins-Minerals (MULTIVITAMIN ADULT PO), Take 1 tablet/day by mouth Daily., Disp: , Rfl:   •  pioglitazone (ACTOS) 15 MG tablet, Take 15 mg by mouth Every Morning., Disp: , Rfl:   •  raNITIdine (ZANTAC) 75 MG tablet, Take 75 mg by mouth 2 (Two)  Times a Day., Disp: , Rfl:   •  VICTOZA 18 MG/3ML solution pen-injector, Take 1.8 mg by mouth Every Morning., Disp: , Rfl:   •  vitamin B-12 (CYANOCOBALAMIN) 100 MCG tablet, Take 50 mcg by mouth Daily., Disp: , Rfl:   •  vitamin C (ASCORBIC ACID) 500 MG tablet, Take 500 mg by mouth Daily., Disp: , Rfl:   Allergies   Allergen Reactions   • Codeine      TONGUE SWELLING   • Latex Rash   • Sulfa Antibiotics Rash        Objective   Vitals:    11/13/17 1345   BP: 132/70   Pulse: 100   SpO2: 98%     Physical Exam   Constitutional: She is oriented to person, place, and time. Vital signs are normal. She appears well-developed.   HENT:   Head: Normocephalic and atraumatic.   Neck: Normal range of motion. Neck supple.   Cardiovascular: Normal rate, regular rhythm, normal heart sounds and intact distal pulses.    No murmur heard.  Pulmonary/Chest: Effort normal and breath sounds normal. She has no wheezes. She has no rhonchi. She has no rales. She exhibits no tenderness.   Abdominal: Soft. There is no tenderness.   Musculoskeletal: Normal range of motion. She exhibits no edema or tenderness.   Neurological: She is alert and oriented to person, place, and time. She has normal strength.   Skin: Skin is warm and dry. No rash noted. No cyanosis or erythema.   Psychiatric: She has a normal mood and affect. Her behavior is normal.     Independent Review of Radiographic Studies:    FDG PET/CT IMAGING SKULL BASE TO MID THIGH       HISTORY: Left lung carcinoma resected 4 weeks ago. History of carcinoma  in the right lung in 2009. Restaging. Evaluate for metastatic disease.      TECHNIQUE: Blood glucose level at time of injection of FDG was 129  mg/dl.  5.7 mCi of FDG was injected. Approximately 90 minutes later, PET  images were obtained. CT images were acquired for attenuation correction  and anatomic localization.      COMPARISON: CT scan of the chest dated 9/18/2017. Previous PET/CT study  dated 6/24/2009.      FINDINGS: The patient  is status post right middle lobectomy and left  lower lobectomy. There is mild pleural thickening and minimal loculated  pleural effusion in the medial aspect of the left lung base that shows  only mild associated hypermetabolism consistent with postoperative  change. Mild hypermetabolism is also noted along the course of a closed  incision in the lateral aspect of the left chest wall. Mild  hypermetabolism is also noted within a partially calcified mildly  enlarged lymph node in the subcarinal region that is quite likely  benign. There is no compelling evidence of metastatic disease within the  chest. There are no foci of pathologic hypermetabolism within the neck,  abdomen or pelvis. There is no metabolic evidence of distant metastatic  disease.      IMPRESSION:  Postoperative changes in the chest as noted. Pleural  thickening and a tiny loculated left pleural effusion at the left lung  base show only mild associated hypermetabolism consistent with postop  change. There is no metabolic evidence of residual primary lung neoplasm  or metastatic disease within the neck, chest, abdomen and pelvis.      This report was finalized on 11/10/2017 9:14 AM by Dr. Jose G Agosto MD.  Assessment/Plan   Assessment:  Stable postoperative course.  Increased belching, possible esophagitis or gastritis.    Plan:  PET scan reveals no evidence of any recurrent or residual disease disease outside the chest.  I suspect this is a stage IA adenocarcinoma of the lung based on her pathology.  She is otherwise doing well with very few complaints.  She does have increased belching as well as what sounds like esophagitis.  After discussing her symptoms with the patient and her family she may be experiencing diaphragmatic irritation and hiccups.  I have offered her over-the-counter proton pump inhibitor.  We will see her back in the office in 6 weeks with repeat chest x-ray.  I have referred her to her local oncologist to evaluate her for any  adjuvant therapy although I think based on her stage IA lung cancer no additional therapy other than surveillance would be recommended. Should you have any questions or concerns regarding your patient's care, please do not hesitate to contact me.  Sincerely, Wayne Morataya M.D.  There are no diagnoses linked to this encounter.

## 2017-12-11 ENCOUNTER — HOSPITAL ENCOUNTER (OUTPATIENT)
Dept: GENERAL RADIOLOGY | Facility: HOSPITAL | Age: 72
Discharge: HOME | End: 2017-12-11
Attending: THORACIC SURGERY (CARDIOTHORACIC VASCULAR SURGERY) | Admitting: THORACIC SURGERY (CARDIOTHORACIC VASCULAR SURGERY)

## 2017-12-11 ENCOUNTER — OFFICE VISIT (OUTPATIENT)
Dept: SURGERY | Facility: CLINIC | Age: 72
End: 2017-12-11

## 2017-12-11 VITALS
WEIGHT: 137.6 LBS | HEART RATE: 105 BPM | DIASTOLIC BLOOD PRESSURE: 77 MMHG | HEIGHT: 61 IN | BODY MASS INDEX: 25.98 KG/M2 | SYSTOLIC BLOOD PRESSURE: 130 MMHG | OXYGEN SATURATION: 98 %

## 2017-12-11 DIAGNOSIS — Z98.890 POST-OPERATIVE STATE: ICD-10-CM

## 2017-12-11 DIAGNOSIS — Z98.890 POST-OPERATIVE STATE: Primary | ICD-10-CM

## 2017-12-11 PROCEDURE — 71020 HC CHEST PA AND LATERAL: CPT

## 2017-12-11 PROCEDURE — 99024 POSTOP FOLLOW-UP VISIT: CPT | Performed by: THORACIC SURGERY (CARDIOTHORACIC VASCULAR SURGERY)

## 2017-12-11 NOTE — PROGRESS NOTES
Subjective   Patient ID: Augustina Lobo is a 72 y.o. female is here today for follow-up after her left lower lobectomy for stage IA adenocarcinoma of the lung.    History of Present Illness  Dear Colleagues,   Augustina Lobo is here today for her second postoperative visit after her surgery for stage IA adenocarcinoma of the lung.  She denies any complaints of fever, chills, cough, hemoptysis, pleuritic chest pain, shortness of air, dyspnea with exertion, night sweats, hoarseness, or unintentional weight loss.  She has no other somatic complaints.    The following portions of the patient's history were reviewed and updated as appropriate: allergies, current medications, past family history, past medical history, past social history, past surgical history and problem list.  Review of Systems   Constitution: Negative.   HENT: Negative.    Eyes: Negative.    Cardiovascular: Negative.    Respiratory: Negative.    Endocrine: Negative.    Hematologic/Lymphatic: Negative.    Skin: Negative.    Musculoskeletal: Negative.    Gastrointestinal: Negative.    Genitourinary: Negative.    Neurological: Negative.    Psychiatric/Behavioral: Negative.         Objective   Physical Exam   Constitutional: She is oriented to person, place, and time. Vital signs are normal. She appears well-developed.   HENT:   Head: Normocephalic and atraumatic.   Neck: Normal range of motion. Neck supple.   Cardiovascular: Normal rate, regular rhythm, normal heart sounds and intact distal pulses.    No murmur heard.  Pulmonary/Chest: Effort normal and breath sounds normal. She has no wheezes. She has no rhonchi. She has no rales. She exhibits no tenderness.   Abdominal: Soft. There is no tenderness.   Musculoskeletal: Normal range of motion. She exhibits no edema or tenderness.   Neurological: She is alert and oriented to person, place, and time. She has normal strength.   Skin: Skin is warm and dry. No rash noted. No cyanosis or erythema.   Psychiatric:  She has a normal mood and affect. Her behavior is normal.       Assessment/Plan   Independent Review of Radiographic Studies:    PA and lateral chest x-ray performed at [facility] on December 11, 2017 demonstrates good expansion of the lungs bilaterally.  No new infiltrates, effusions or pneumothoraces.  Postoperative changes are noted within the left hemithorax.  Official report is still pending from the department of radiology.  Assessment:  Stable postoperative course with no signs of recurrent or residual disease.  Plan:  Plan for follow-up in 3 months with repeat chest x-ray.  She shows no signs of any residual or recurrent disease at this time.  I have encouraged increase her physical activity as tolerated. Should you have any questions or concerns regarding your patient's care, please do not hesitate to contact me.  Sincerely, Wayne Morataya M.D.  There are no diagnoses linked to this encounter.

## 2018-03-26 ENCOUNTER — OFFICE VISIT (OUTPATIENT)
Dept: SURGERY | Facility: CLINIC | Age: 73
End: 2018-03-26

## 2018-03-26 ENCOUNTER — HOSPITAL ENCOUNTER (OUTPATIENT)
Dept: GENERAL RADIOLOGY | Facility: HOSPITAL | Age: 73
Discharge: HOME | End: 2018-03-26
Admitting: THORACIC SURGERY (CARDIOTHORACIC VASCULAR SURGERY)

## 2018-03-26 VITALS
WEIGHT: 144.4 LBS | DIASTOLIC BLOOD PRESSURE: 71 MMHG | BODY MASS INDEX: 27.26 KG/M2 | HEART RATE: 93 BPM | HEIGHT: 61 IN | OXYGEN SATURATION: 97 % | SYSTOLIC BLOOD PRESSURE: 131 MMHG

## 2018-03-26 DIAGNOSIS — Z85.118 HISTORY OF LUNG CANCER: ICD-10-CM

## 2018-03-26 DIAGNOSIS — Z98.890 POST-OPERATIVE STATE: ICD-10-CM

## 2018-03-26 DIAGNOSIS — Z09 EXAMINATION FOLLOWING SURGERY: Primary | ICD-10-CM

## 2018-03-26 PROCEDURE — 71046 X-RAY EXAM CHEST 2 VIEWS: CPT

## 2018-03-26 PROCEDURE — 99213 OFFICE O/P EST LOW 20 MIN: CPT | Performed by: NURSE PRACTITIONER

## 2018-03-26 NOTE — PROGRESS NOTES
Patient ID: Augustina Lobo is a 72 y.o. female is here today for follow-up.    Subjective     Chief Complaint   Patient presents with   • Follow-up     CXR today        History of Present Illness    Augustina Lobo presents to the office today for continued follow-up and surveillance concerning prior history of stage IA adenocarcinoma of the lung s/p left lower lobectomy in October 2017 per Dr. Morataya.  Since she was last seen, she has been doing well.  She continues to have intermittent left-sided intercostal nerve pain especially when she sneezes or coughs.  She does not feel her symptoms have worsened.  She is able to carry on her normal activities.  She has shortness of breath with exertion which quickly resolves with rest. . She denies any complaints of fever, chills, cough, hemoptysis, night sweats, hoarseness, unintentional weight loss, or any other problems or concerns.        Patient Active Problem List   Diagnosis   • History of lung cancer   • Ground glass opacity present on imaging of lung   • Lung nodule   • Lung nodule, solitary   • Post-operative state     Past Medical History:   Diagnosis Date   • Arthritis    • Diabetes mellitus    • Elevated cholesterol    • History of diverticulitis    • Hypertension    • Lung cancer 06/09/2009    Resected Right Lobectomy per Dr. Morataya     Past Surgical History:   Procedure Laterality Date   • APPENDECTOMY     • BUNIONECTOMY     • DILATATION AND CURETTAGE      Times 2   • EYE SURGERY     • HYSTERECTOMY     • LUNG LOBECTOMY Right    • THORACOSCOPY Left 10/10/2017    Procedure: LEFT THORACOSCOPY VIDEO ASSISTED.EXPLORATORY LEFT THORACOSCOPY WITH SUPERIOR SEGMENTECTOMY, COMPLETION LEFT LOWER LOBECTOMY EXPLORATORY BRONCHOSCOPY. SUBPLEURAL PAIN CATHETER INSERTION X2;  Surgeon: Wayne Morataya MD;  Location: [facility];  Service:    • TONSILLECTOMY       Family History   Problem Relation Age of Onset   • Malig Hyperthermia Neg Hx      Social History     Social History    • Marital status:      Spouse name: N/A   • Number of children: N/A   • Years of education: N/A     Occupational History   • Not on file.     Social History Main Topics   • Smoking status: Former Smoker     Packs/day: 0.75     Years: 18.00     Types: Cigarettes     Quit date: 6/14/1969   • Smokeless tobacco: Never Used   • Alcohol use No   • Drug use: No   • Sexual activity: Defer     Other Topics Concern   • Not on file     Social History Narrative   • No narrative on file       Current Outpatient Prescriptions:   •  aspirin 81 MG EC tablet, Take 81 mg by mouth Daily. PT HOLDING FOR SURGERY, Disp: , Rfl:   •  atorvastatin (LIPITOR) 80 MG tablet, Take 80 mg by mouth Every Night., Disp: , Rfl:   •  calcium citrate-vitamin d (CITRACAL) 200-250 MG-UNIT tablet tablet, Take 1 tablet by mouth Daily., Disp: , Rfl:   •  citalopram (CeleXA) 20 MG tablet, Take 20 mg by mouth Every Morning., Disp: , Rfl:   •  CRANBERRY PO, Take 1 tablet/day by mouth Daily., Disp: , Rfl:   •  glimepiride (AMARYL) 4 MG tablet, Take 4 mg by mouth Every Morning Before Breakfast., Disp: , Rfl:   •  HYDROcodone-acetaminophen (NORCO) 7.5-325 MG per tablet, Take 1-2 tablets every 6 hours prn pain, Disp: 60 tablet, Rfl: 0  •  lisinopril-hydrochlorothiazide (PRINZIDE,ZESTORETIC) 10-12.5 MG per tablet, Take 1 tablet by mouth Every Morning., Disp: , Rfl:   •  metFORMIN (GLUCOPHAGE) 1000 MG tablet, Take 1,000 mg by mouth 2 (Two) Times a Day., Disp: , Rfl:   •  Multiple Vitamins-Minerals (MULTIVITAMIN ADULT PO), Take 1 tablet/day by mouth Daily., Disp: , Rfl:   •  pioglitazone (ACTOS) 15 MG tablet, Take 15 mg by mouth Every Morning., Disp: , Rfl:   •  raNITIdine (ZANTAC) 75 MG tablet, Take 75 mg by mouth 2 (Two) Times a Day., Disp: , Rfl:   •  VICTOZA 18 MG/3ML solution pen-injector, Take 1.8 mg by mouth Every Morning., Disp: , Rfl:   •  vitamin B-12 (CYANOCOBALAMIN) 100 MCG tablet, Take 50 mcg by mouth Daily., Disp: , Rfl:   •  vitamin C (ASCORBIC  ACID) 500 MG tablet, Take 500 mg by mouth Daily., Disp: , Rfl:   Allergies   Allergen Reactions   • Codeine      TONGUE SWELLING   • Latex Rash   • Sulfa Antibiotics Rash         Review of Systems   Constitution: Negative.   HENT: Negative.    Eyes: Negative.    Cardiovascular: Positive for chest pain (left sided pleuritic chest pain).   Respiratory: Positive for shortness of breath (worse with exertion).    Endocrine: Negative.    Hematologic/Lymphatic: Negative.    Skin: Negative.    Musculoskeletal: Negative.    Gastrointestinal: Negative.    Genitourinary: Negative.    Neurological: Negative.    Psychiatric/Behavioral: Negative.        Vitals:    03/26/18 1327   BP: 131/71   Pulse: 93   SpO2: 97%       Objective     Physical Exam   Constitutional: She is oriented to person, place, and time. Vital signs are normal. She appears well-developed.   HENT:   Head: Normocephalic and atraumatic.   Neck: Normal range of motion. Neck supple.   Cardiovascular: Normal rate, regular rhythm, normal heart sounds and intact distal pulses.    No murmur heard.  Pulmonary/Chest: Effort normal and breath sounds normal. She has no wheezes. She has no rhonchi. She has no rales. She exhibits no tenderness.   Abdominal: Soft. There is no tenderness.   Musculoskeletal: Normal range of motion. She exhibits no edema or tenderness.   Neurological: She is alert and oriented to person, place, and time. She has normal strength.   Skin: Skin is warm and dry. No rash noted. No cyanosis or erythema.   Psychiatric: She has a normal mood and affect. Her behavior is normal.       Review of Radiographic Studies:    Study Result     XR CHEST 2 VW-     HISTORY: Female who is 72 years-old,  lung cancer     TECHNIQUE: Frontal and lateral views of the chest     COMPARISON: 12/11/2017     FINDINGS: Heart, mediastinum and pulmonary vasculature are unremarkable.  No pulmonary mass is noted; if indicated, CT can be obtained for more  sensitive evaluation for  small pulmonary lesions No focal pulmonary  consolidation, pleural effusion, or pneumothorax. Abundant material is  apparent in the stomach, could reflect delayed or incomplete gastric  emptying, correlate clinically. No acute osseous process.     IMPRESSION:  No pulmonary mass is identified, as described.     This report was finalized on 3/26/2018 1:41 PM by Dr. Enrique Carnes MD.         Assessment/Plan   Diagnoses and all orders for this visit:    Examination following surgery    History of lung cancer        SUMMARY  Augustina Lobo has been doing well since she was last seen.  She denies any new complaints or concerns.  Reassured the left-sided intercostal neuralgia pain is expected postoperative findings and it should gradually improve over time.  Her CXR remains stable.  No new nodules, effusions, or infiltrates.  She states she is scheduled for a CT scan of the chest per her oncologist for next month in [place], [place].  Instructed to send results to our office for review.  If the CT remains stable, then we will be able to start our continued surveillance with routine serial CT scans every 6 months.  In the meantime, instructed to contact us sooner for any problems or concerns.        Ofelia Smith, APRN  03/26/18  2:40 PM

## 2019-05-16 ENCOUNTER — TELEPHONE (OUTPATIENT)
Dept: SURGERY | Facility: CLINIC | Age: 74
End: 2019-05-16

## 2019-06-04 ENCOUNTER — OFFICE VISIT (OUTPATIENT)
Dept: SURGERY | Facility: CLINIC | Age: 74
End: 2019-06-04

## 2019-06-04 VITALS
WEIGHT: 149 LBS | OXYGEN SATURATION: 96 % | SYSTOLIC BLOOD PRESSURE: 139 MMHG | HEART RATE: 86 BPM | HEIGHT: 62 IN | DIASTOLIC BLOOD PRESSURE: 62 MMHG | BODY MASS INDEX: 27.42 KG/M2

## 2019-06-04 DIAGNOSIS — C34.32 MALIGNANT NEOPLASM OF LOWER LOBE OF LEFT LUNG (HCC): Primary | ICD-10-CM

## 2019-06-04 PROCEDURE — 99213 OFFICE O/P EST LOW 20 MIN: CPT | Performed by: THORACIC SURGERY (CARDIOTHORACIC VASCULAR SURGERY)

## 2019-06-04 NOTE — PROGRESS NOTES
Subjective   Patient ID: Augustina Lobo is a 74 y.o. female is here today for follow-up.    History of Present Illness  Dear Colleague,  Augustina Lobo was seen in our office today for follow-up of a right VAT with right middle lobectomy performed in 2009 for stage I carcinoma of the lung and a left VAT with left superior segmentectomy performed October 2017 for another stage I carcinoma of the lung.  She was lost to follow-up in our office because of a mixup in scheduling.  Fortunately she has been followed by an oncologist in [place] who has been seeing her on a regular basis.  She is here today for follow-up with a CT scan with some new findings.    She has a persistent dry cough.  She has no hemoptysis.  She has no pleuritic pain.  She has no hoarseness or change in her voice.  She has had no unexplained weight loss.  She gets short of breath climbing steps but admits that she leads a fairly sedentary life.    The following portions of the patient's history were reviewed and updated as appropriate: allergies, current medications, past family history, past medical history, past social history, past surgical history and problem list.  Review of Systems   Constitution: Negative.   HENT: Negative.    Eyes: Negative.    Cardiovascular: Negative.    Respiratory: Positive for cough.    Endocrine: Negative.    Hematologic/Lymphatic: Negative.    Skin: Negative.    Musculoskeletal: Negative.    Gastrointestinal: Negative.    Genitourinary: Negative.    Neurological: Negative.    Psychiatric/Behavioral: Negative.    Allergic/Immunologic: Negative.      Patient Active Problem List   Diagnosis   • History of lung cancer   • Ground glass opacity present on imaging of lung   • Lung nodule   • Lung nodule, solitary   • Post-operative state     Past Medical History:   Diagnosis Date   • Arthritis    • Diabetes mellitus (CMS/HCC)    • Elevated cholesterol    • History of diverticulitis    • Hypertension    • Lung cancer  (CMS/[facility]) 2009    Resected Right Lobectomy per Dr. Morataya     Past Surgical History:   Procedure Laterality Date   • APPENDECTOMY     • BUNIONECTOMY     • DILATATION AND CURETTAGE      Times 2   • EYE SURGERY     • HYSTERECTOMY     • LUNG LOBECTOMY Right    • THORACOSCOPY Left 10/10/2017    Procedure: LEFT THORACOSCOPY VIDEO ASSISTED.EXPLORATORY LEFT THORACOSCOPY WITH SUPERIOR SEGMENTECTOMY, COMPLETION LEFT LOWER LOBECTOMY EXPLORATORY BRONCHOSCOPY. SUBPLEURAL PAIN CATHETER INSERTION X2;  Surgeon: Wayne Morataya MD;  Location: [facility] OR;  Service:    • TONSILLECTOMY       Family History   Problem Relation Age of Onset   • Malig Hyperthermia Neg Hx      Social History     Socioeconomic History   • Marital status:      Spouse name: Not on file   • Number of children: Not on file   • Years of education: Not on file   • Highest education level: Not on file   Tobacco Use   • Smoking status: Former Smoker     Packs/day: 0.75     Years: 18.00     Pack years: 13.50     Types: Cigarettes     Last attempt to quit: 1969     Years since quittin.0   • Smokeless tobacco: Never Used   Substance and Sexual Activity   • Alcohol use: No   • Drug use: No   • Sexual activity: Defer       Current Outpatient Medications:   •  aspirin 81 MG EC tablet, Take 81 mg by mouth Daily. PT HOLDING FOR SURGERY, Disp: , Rfl:   •  atorvastatin (LIPITOR) 80 MG tablet, Take 80 mg by mouth Every Night., Disp: , Rfl:   •  calcium citrate-vitamin d (CITRACAL) 200-250 MG-UNIT tablet tablet, Take 1 tablet by mouth Daily., Disp: , Rfl:   •  citalopram (CeleXA) 20 MG tablet, Take 20 mg by mouth Every Morning., Disp: , Rfl:   •  CRANBERRY PO, Take 1 tablet/day by mouth Daily., Disp: , Rfl:   •  glimepiride (AMARYL) 4 MG tablet, Take 4 mg by mouth Every Morning Before Breakfast., Disp: , Rfl:   •  HYDROcodone-acetaminophen (NORCO) 7.5-325 MG per tablet, Take 1-2 tablets every 6 hours prn pain, Disp: 60 tablet, Rfl: 0  •   lisinopril-hydrochlorothiazide (PRINZIDE,ZESTORETIC) 10-12.5 MG per tablet, Take 1 tablet by mouth Every Morning., Disp: , Rfl:   •  metFORMIN (GLUCOPHAGE) 1000 MG tablet, Take 1,000 mg by mouth 2 (Two) Times a Day., Disp: , Rfl:   •  Multiple Vitamins-Minerals (MULTIVITAMIN ADULT PO), Take 1 tablet/day by mouth Daily., Disp: , Rfl:   •  pioglitazone (ACTOS) 15 MG tablet, Take 15 mg by mouth Every Morning., Disp: , Rfl:   •  raNITIdine (ZANTAC) 75 MG tablet, Take 75 mg by mouth 2 (Two) Times a Day., Disp: , Rfl:   •  VICTOZA 18 MG/3ML solution pen-injector, Take 1.8 mg by mouth Every Morning., Disp: , Rfl:   •  vitamin B-12 (CYANOCOBALAMIN) 100 MCG tablet, Take 50 mcg by mouth Daily., Disp: , Rfl:   •  vitamin C (ASCORBIC ACID) 500 MG tablet, Take 500 mg by mouth Daily., Disp: , Rfl:   Allergies   Allergen Reactions   • Codeine      TONGUE SWELLING   • Latex Rash   • Sulfa Antibiotics Rash        Objective   Vitals:    06/04/19 1338   BP: 139/62   Pulse: 86   SpO2: 96%     Physical Exam   Constitutional: She is oriented to person, place, and time. She appears well-developed and well-nourished.   HENT:   Head: Normocephalic.   Eyes: Conjunctivae, EOM and lids are normal. Pupils are equal, round, and reactive to light.   Neck: Trachea normal and normal range of motion. Neck supple. No hepatojugular reflux and no JVD present. Carotid bruit is not present. No thyroid mass and no thyromegaly present.   Cardiovascular: Normal rate, regular rhythm, S1 normal, S2 normal, normal heart sounds and normal pulses.  No extrasystoles are present. PMI is not displaced.   Pulmonary/Chest: Effort normal and breath sounds normal.   Abdominal: Soft. Normal appearance and bowel sounds are normal. She exhibits no mass. There is no hepatosplenomegaly. There is no tenderness. No hernia.   Musculoskeletal: Normal range of motion.   Neurological: She is alert and oriented to person, place, and time. She has normal strength and normal  reflexes. No cranial nerve deficit or sensory deficit. She displays a negative Romberg sign.   Skin: Skin is warm, dry and intact.   Psychiatric: She has a normal mood and affect. Her speech is normal and behavior is normal. Judgment and thought content normal. Cognition and memory are normal.     Independent Review of Radiographic Studies:    CT of the chest performed in [facility] was independently reviewed and compared to previous x-rays.  There is some scarring in the left lung.  There are postoperative changes on the right.  There is a hazy area in the periphery of the right lung base which has the appearance of inflammatory process.  There are no pleural effusions.  There are no suspicious hilar or mediastinal adenopathy.  There are no definite infiltrates nodules or masses.    Assessment/Plan   Assessment: Suspect that this area in the right lung base is inflammatory in nature.  He does not have the appearance of a malignancy.  I do believe that short-term follow-up with a repeat CT of the chest in about 3 months is appropriate management.  I have discussed this with the patient and her family in detail.  They understand my plan and agree.      Plan: Patient will return to see me in August after CT scan of the chest which is already been ordered by her oncologist.    Diagnoses and all orders for this visit:    Malignant neoplasm of lower lobe of left lung (CMS/HCC)

## (undated) DEVICE — TOWEL,OR,DSP,ST,BLUE,STD,4/PK,20PK/CS: Brand: MEDLINE

## (undated) DEVICE — DRP SLUSH WARMR MACH 52X66IN OM-ORS-301

## (undated) DEVICE — LOU THORACIC: Brand: MEDLINE INDUSTRIES, INC.

## (undated) DEVICE — YANKAUER: Brand: DEROYAL

## (undated) DEVICE — SUT SILK 2/0 SH CR5 18IN C0125

## (undated) DEVICE — APPL CHLORAPREP W/TINT 26ML ORNG

## (undated) DEVICE — TOTAL TRAY, 16FR 10ML SIL FOLEY, URN: Brand: MEDLINE

## (undated) DEVICE — ENCORE® LATEX ORTHO SIZE 7.5, STERILE LATEX POWDER-FREE SURGICAL GLOVE: Brand: ENCORE

## (undated) DEVICE — SYS PERFUS SEP PLATLT W TIPS CUST

## (undated) DEVICE — SUT SILK 0 TIES 30IN A306H

## (undated) DEVICE — APL DUPLOSPRAYER MIS 40CM

## (undated) DEVICE — SUT SILK 0 PSL 18IN 580H

## (undated) DEVICE — FLEXIPATH FLEXIBLE SURGICAL TROCARS: Brand: FLEXIPATH

## (undated) DEVICE — Device: Brand: TISSUE RETRIEVAL SYSTEM

## (undated) DEVICE — NDL BLNT 18G 1 1/2IN

## (undated) DEVICE — 3M™ STERI-STRIP™ COMPOUND BENZOIN TINCTURE 40 BAGS/CARTON 4 CARTONS/CASE C1544: Brand: 3M™ STERI-STRIP™

## (undated) DEVICE — ECHELON FLEX  POWERED VASCULAR STAPLER WITH ADVANCED PLACEMENT TIP, 35MM: Brand: ECHELON FLEX

## (undated) DEVICE — KTTNER ENDO BLNT DISSCT

## (undated) DEVICE — STPLR SKIN VISISTAT WD 35CT

## (undated) DEVICE — THE ECHELON FLEX POWERED PLUS ARTICULATING ENDOSCOPIC LINEAR CUTTERS ARE STERILE, SINGLE PATIENT USE INSTRUMENTS THAT SIMULTANEOUSLYCUT AND STAPLE TISSUE. THERE ARE SIX STAGGERED ROWS OF STAPLES, THREE ON EITHER SIDE OF THE CUT LINE. THE ECHELON FLEX 45 POWERED PLUSINSTRUMENTS HAVE A STAPLE LINE THAT IS APPROXIMATELY 45 MM LONG AND A CUT LINE THAT IS APPROXIMATELY 42 MM LONG. THE SHAFT CAN ROTATE FREELYIN BOTH DIRECTIONS AND AN ARTICULATION MECHANISM ENABLES THE DISTAL PORTION OF THE SHAFT TO PIVOT TO FACILITATE LATERAL ACCESS TO THE OPERATIVESITE.THE INSTRUMENTS ARE PACKAGED WITH A PRIMARY LITHIUM BATTERY PACK THAT MUST BE INSTALLED PRIOR TO USE. THERE ARE SPECIFIC REQUIREMENTS FORDISPOSING OF THE BATTERY PACK. REFER TO THE BATTERY PACK DISPOSAL SECTION.THE INSTRUMENTS ARE PACKAGED WITHOUT A RELOAD AND MUST BE LOADED PRIOR TO USE. A STAPLE RETAINING CAP ON THE RELOAD PROTECTS THE STAPLE LEGPOINTS DURING SHIPPING AND TRANSPORTATION. THE INSTRUMENTS’ LOCK-OUT FEATURE IS DESIGNED TO PREVENT A USED OR IMPROPERLY INSTALLED RELOADFROM BEING REFIRED OR AN INSTRUMENT FROM BEING FIRED WITHOUT A RELOAD.: Brand: ECHELON FLEX

## (undated) DEVICE — ANTIBACTERIAL UNDYED BRAIDED (POLYGLACTIN 910), SYNTHETIC ABSORBABLE SUTURE: Brand: COATED VICRYL

## (undated) DEVICE — DRSNG TELFA PAD NONADH STR 1S 3X4IN

## (undated) DEVICE — 28 FR STRAIGHT – SOFT PVC CATHETER: Brand: PVC THORACIC CATHETERS

## (undated) DEVICE — DRSNG SURESITE WNDW 4X4.5

## (undated) DEVICE — DRAPE,UTILITY,TAPE,15X26,STERILE: Brand: MEDLINE

## (undated) DEVICE — DRAPE,REIN 53X77,STERILE: Brand: MEDLINE

## (undated) DEVICE — SPNG GZ WOVN 4X4IN 12PLY 10/BX STRL

## (undated) DEVICE — Device

## (undated) DEVICE — 3M™ MEDIPORE™ H SOFT CLOTH SURGICAL TAPE 2862, 2 INCH X 10 YARD (5CM X 9,1M), 12 ROLLS/CASE: Brand: 3M™ MEDIPORE™

## (undated) DEVICE — SUT VIC 2/0 UR6 27IN J602H

## (undated) DEVICE — Device: Brand: FABCO

## (undated) DEVICE — ELECTROSURGICAL DEVICE HOLSTER;FOR USE WITH MAXIMUM PEAK VOLTAGE OF 4000 V: Brand: FORCE TRIVERSE

## (undated) DEVICE — 3M™ STERI-STRIP™ REINFORCED ADHESIVE SKIN CLOSURES, R1547, 1/2 IN X 4 IN (12 MM X 100 MM), 6 STRIPS/ENVELOPE: Brand: 3M™ STERI-STRIP™